# Patient Record
Sex: FEMALE | Race: OTHER | HISPANIC OR LATINO | URBAN - METROPOLITAN AREA
[De-identification: names, ages, dates, MRNs, and addresses within clinical notes are randomized per-mention and may not be internally consistent; named-entity substitution may affect disease eponyms.]

---

## 2021-01-20 ENCOUNTER — EMERGENCY (EMERGENCY)
Facility: HOSPITAL | Age: 51
LOS: 1 days | Discharge: HOME | End: 2021-01-20
Admitting: EMERGENCY MEDICINE
Payer: MEDICAID

## 2021-01-20 VITALS
HEART RATE: 84 BPM | TEMPERATURE: 99 F | WEIGHT: 169.98 LBS | SYSTOLIC BLOOD PRESSURE: 157 MMHG | OXYGEN SATURATION: 99 % | DIASTOLIC BLOOD PRESSURE: 90 MMHG | RESPIRATION RATE: 16 BRPM

## 2021-01-20 DIAGNOSIS — K05.10 CHRONIC GINGIVITIS, PLAQUE INDUCED: ICD-10-CM

## 2021-01-20 DIAGNOSIS — R68.84 JAW PAIN: ICD-10-CM

## 2021-01-20 DIAGNOSIS — K08.89 OTHER SPECIFIED DISORDERS OF TEETH AND SUPPORTING STRUCTURES: ICD-10-CM

## 2021-01-20 PROCEDURE — 99283 EMERGENCY DEPT VISIT LOW MDM: CPT

## 2021-01-20 RX ORDER — LIDOCAINE 4 G/100G
10 CREAM TOPICAL ONCE
Refills: 0 | Status: COMPLETED | OUTPATIENT
Start: 2021-01-20 | End: 2021-01-20

## 2021-01-20 RX ORDER — IBUPROFEN 200 MG
1 TABLET ORAL
Qty: 20 | Refills: 0
Start: 2021-01-20 | End: 2021-02-18

## 2021-01-20 RX ORDER — DIPHENHYDRAMINE HCL 50 MG
50 CAPSULE ORAL ONCE
Refills: 0 | Status: COMPLETED | OUTPATIENT
Start: 2021-01-20 | End: 2021-01-20

## 2021-01-20 RX ORDER — DIPHENHYDRAMINE HCL 50 MG
50 CAPSULE ORAL ONCE
Refills: 0 | Status: DISCONTINUED | OUTPATIENT
Start: 2021-01-20 | End: 2021-01-20

## 2021-01-20 RX ORDER — DIPHENHYDRAMINE HCL 50 MG
50 CAPSULE ORAL EVERY 6 HOURS
Refills: 0 | Status: DISCONTINUED | OUTPATIENT
Start: 2021-01-20 | End: 2021-01-20

## 2021-01-20 RX ORDER — PENICILLIN V POTASSIUM 250 MG
1 TABLET ORAL
Qty: 21 | Refills: 0
Start: 2021-01-20 | End: 2021-01-26

## 2021-01-20 RX ORDER — IBUPROFEN 200 MG
800 TABLET ORAL ONCE
Refills: 0 | Status: COMPLETED | OUTPATIENT
Start: 2021-01-20 | End: 2021-01-20

## 2021-01-20 RX ORDER — PENICILLIN V POTASSIUM 250 MG
500 TABLET ORAL ONCE
Refills: 0 | Status: COMPLETED | OUTPATIENT
Start: 2021-01-20 | End: 2021-01-20

## 2021-01-20 RX ADMIN — LIDOCAINE 10 MILLILITER(S): 4 CREAM TOPICAL at 21:47

## 2021-01-20 RX ADMIN — Medication 500 MILLIGRAM(S): at 21:47

## 2021-01-20 RX ADMIN — Medication 800 MILLIGRAM(S): at 21:47

## 2021-01-20 RX ADMIN — Medication 30 MILLILITER(S): at 21:47

## 2021-01-20 RX ADMIN — Medication 50 MILLIGRAM(S): at 21:47

## 2021-01-20 NOTE — ED PROVIDER NOTE - PATIENT PORTAL LINK FT
You can access the FollowMyHealth Patient Portal offered by [facility] by registering at the following website: [email or web address]. By joining [employer]’s FollowMyHealth portal, you will also be able to view your health information using other applications (apps) compatible with our system.

## 2021-01-20 NOTE — ED ADULT NURSE NOTE - NSIMPLEMENTINTERV_GEN_ALL_ED
Implemented All Universal Safety Interventions:  [place] to call system. Call bell, personal items and telephone within reach. Instruct patient to call for assistance. Room bathroom lighting operational. Non-slip footwear when patient is off stretcher. Physically safe environment: no spills, clutter or unnecessary equipment. Stretcher in lowest position, wheels locked, appropriate side rails in place.

## 2021-01-20 NOTE — ED PROVIDER NOTE - OBJECTIVE STATEMENT
51 yo F with PMHx of HTN, DM, frequent 51 yo F with PMHx of HTN, DM, frequent dental/gum infection, has appt with her private dentist tomorrow, presenting c/o toothache x few days.  Pt reports brushing her teeth harshly few days ago and induced some mild bleeding at that time.  Since then has been mild gum swelling and pain with biting.  Noted worsening pain today with no improvement taking advil and tylenol.  last dose APAP this afternoon and attempted icing with minimal improvement.  Denies fever, chills, change in phonation, drooling, throat/facial pain, d/c, earache, tinnitus, HA, dizziness, N/V/D/C, cough, SOB, CP, palpitations, focal weakness, paresthesia, rash, trauma, and fall

## 2021-01-20 NOTE — ED ADULT TRIAGE NOTE - CHIEF COMPLAINT QUOTE
dental pain on left side has appt with dentist tomorrow, nil obvious swelling, took 600mg motrin last dose 1600 , last dose tylenol 10 am 500mg

## 2021-01-20 NOTE — ED PROVIDER NOTE - CARE PROVIDER_API CALL
please follow up with your dentist tomorrow,   Phone: (   )    -  Fax: (   )    -  Follow Up Time:

## 2021-01-20 NOTE — ED PROVIDER NOTE - PHYSICAL EXAMINATION
Gen - WDWN F, NAD, comfortable and non-toxic appearing  Skin - warm, dry, intact, no rash   HEENT - AT/NC, PERRL, EOMI, o/p clear, +poor dentition with multiple dental caries and mild edema with friable gum edges to the L upper jaw (around tooth 13-15) with plaque formation and TTP, no fluctuance, d/c, bleeding, ulceration or exudate, airway patent, neck supple, no palpable nodes or facial edema   CV - S1S2, R/R/R  Resp - CTAB, no r/r/w  GI - soft, ND, NT, no CVAT b/l   MS - w/w/p, no c/c/e  Neuro - AxOx3, no focal neuro deficits, CN II-XII grossly intact, ambulatory without gait disturbance

## 2021-01-20 NOTE — ED PROVIDER NOTE - NSFOLLOWUPINSTRUCTIONS_ED_ALL_ED_FT
Gingivitis    WHAT YOU NEED TO KNOW:    What is gingivitis? Gingivitis is mild gum disease. It is an infection caused by germs called bacteria. Gingivitis occurs when there is a buildup of plaque (sticky film) on your teeth and gums. Plaque contains bacteria that can irritate your gums, and cause an infection. Without treatment, gingivitis may lead to a more serious form of gum disease called periodontitis. Periodontitis can cause other dental problems, and you may even may even lose your teeth. Gingivitis can be treated with good dental care from your dentist and at home. Gingivitis can go away, but may come back if you do not keep cleaning your teeth properly at home.    What may increase my risk of gingivitis?   •Not brushing or flossing your teeth every day, or cleaning your teeth well enough to remove plaque.      •Not visiting your dentist regularly for exams and cleanings.      •Pregnancy, diabetes, HIV infection, and diseases that decrease your immune system. Your body's immune system fights off infection.       •Smoking or chewing tobacco, poor diet, and stress.       •Taking certain types of medicines such as steroids, drugs that treat depression, and birth control pills.      •Having dental problems that make it hard to remove plaque. This may include bridges or dentures that do not fit right, and crooked teeth.      •Getting older, and having a family history of gum disease.      What are signs and symptoms of gingivitis? You may have red, swollen gums. Your gums may or may not be painful. Your gums may bleed when you brush or floss your teeth. Halitosis (bad breath) is worse if you have gingivitis.    How is gingivitis diagnosed? Your dentist will check your gums for swelling and redness. Your dentist will also use a dental probe to check for bleeding. X-rays may be taken of your mouth and teeth.     How is gingivitis treated? If your dentist finds that your gingivitis is found early and is not too bad, you may be able to treat it with good dental care at home. In some cases, you may need to visit your dentist more often for special dental cleanings. During these visits, your dentist may need to remove hard plaque from your teeth with special tools. Your dentist may also need to treat any dental problems that make it hard for you to clean your teeth well. Some of these problems include crooked teeth, or bridges and dentures that do not fit right.     How do I care for my teeth at home? Clean your teeth very well every day to remove plaque. Brush your teeth for at least 2 minutes, twice a day. A battery-powered toothbrush may remove plaque better than a regular toothbrush. You will also need to floss your teeth every day. Your dentist may also ask you to use a special dental rinse. These special rinses may help to reduce plaque and decrease swelling of your gums. If you smoke, you should quit. Smoking increases your risk of getting periodontitis, which can occur if your gingivitis gets worse. Smoking also decreases how well treatments for gum disease work.     How can I help prevent gingivitis?   •Brush your teeth 2 times a day after meals with fluoride toothpaste.      •Use dental floss to clean between your teeth at least once a day.      •Ask your dentist if you should use a dental rinse, and what kind may work best for you.      •See your dentist regularly for dental cleanings and oral exams.       When should I contact my healthcare provider?   •The bleeding in your gums gets worse.      •You cannot use a toothbrush, or you cannot use dental floss.      •You have a sore or lump on your gums that stays for over 3 weeks.       •You have a fever.       •You have 1 or more teeth that are loose for over 3 weeks, and you do not know why.       •Your face or neck is swollen.       •Your gums are more painful, or become painful.      When should I seek immediate care or call 911?   •You have new trouble swallowing.      •You have trouble breathing. Gingivitis    WHAT YOU NEED TO KNOW:    What is gingivitis? Gingivitis is mild gum disease. It is an infection caused by germs called bacteria. Gingivitis occurs when there is a buildup of plaque (sticky film) on your teeth and gums. Plaque contains bacteria that can irritate your gums, and cause an infection. Without treatment, gingivitis may lead to a more serious form of gum disease called periodontitis. Periodontitis can cause other dental problems, and you may even may even lose your teeth. Gingivitis can be treated with good dental care from your dentist and at home. Gingivitis can go away, but may come back if you do not keep cleaning your teeth properly at home.    What may increase my risk of gingivitis?   •Not brushing or flossing your teeth every day, or cleaning your teeth well enough to remove plaque.      •Not visiting your dentist regularly for exams and cleanings.      •Pregnancy, diabetes, HIV infection, and diseases that decrease your immune system. Your body's immune system fights off infection.       •Smoking or chewing tobacco, poor diet, and stress.       •Taking certain types of medicines such as steroids, drugs that treat depression, and birth control pills.      •Having dental problems that make it hard to remove plaque. This may include bridges or dentures that do not fit right, and crooked teeth.      •Getting older, and having a family history of gum disease.      What are signs and symptoms of gingivitis? You may have red, swollen gums. Your gums may or may not be painful. Your gums may bleed when you brush or floss your teeth. Halitosis (bad breath) is worse if you have gingivitis.    How is gingivitis diagnosed? Your dentist will check your gums for swelling and redness. Your dentist will also use a dental probe to check for bleeding. X-rays may be taken of your mouth and teeth.     How is gingivitis treated? If your dentist finds that your gingivitis is found early and is not too bad, you may be able to treat it with good dental care at home. In some cases, you may need to visit your dentist more often for special dental cleanings. During these visits, your dentist may need to remove hard plaque from your teeth with special tools. Your dentist may also need to treat any dental problems that make it hard for you to clean your teeth well. Some of these problems include crooked teeth, or bridges and dentures that do not fit right.     How do I care for my teeth at home? Clean your teeth very well every day to remove plaque. Brush your teeth for at least 2 minutes, twice a day. A battery-powered toothbrush may remove plaque better than a regular toothbrush. You will also need to floss your teeth every day. Your dentist may also ask you to use a special dental rinse. These special rinses may help to reduce plaque and decrease swelling of your gums. If you smoke, you should quit. Smoking increases your risk of getting periodontitis, which can occur if your gingivitis gets worse. Smoking also decreases how well treatments for gum disease work.     How can I help prevent gingivitis?   •Brush your teeth 2 times a day after meals with fluoride toothpaste.      •Use dental floss to clean between your teeth at least once a day.      •Ask your dentist if you should use a dental rinse, and what kind may work best for you.      •See your dentist regularly for dental cleanings and oral exams.       When should I contact my healthcare provider?   •The bleeding in your gums gets worse.      •You cannot use a toothbrush, or you cannot use dental floss.      •You have a sore or lump on your gums that stays for over 3 weeks.       •You have a fever.       •You have 1 or more teeth that are loose for over 3 weeks, and you do not know why.       •Your face or neck is swollen.       •Your gums are more painful, or become painful.      When should I seek immediate care or call 911?   •You have new trouble swallowing.      •You have trouble breathing.    Dental Pain    Dental pain (toothache) may be caused by many things including tooth decay (cavities or caries), abscess or infection, or trauma. If you were prescribed an antibiotic medicine, finish all of it even if you start to feel better. Rinsing your mouth with salt water or applying ice to the painful area of your face may help with the pain. Follow up with a dentist is important in ensuring good oral health and preventing the worsening of dental disease.    SEEK IMMEDIATE MEDICAL CARE IF YOU HAVE ANY OF THE FOLLOWING SYMPTOMS: unable to open your mouth, trouble breathing or swallowing, fever, or swelling of the face, neck, or jaw.

## 2021-01-20 NOTE — ED PROVIDER NOTE - CLINICAL SUMMARY MEDICAL DECISION MAKING FREE TEXT BOX
AFVSS at time of d/c, pt non-toxic appearing, results, ddx, and f/u plans discussed with pt at bedside, d/c'd home to f/u with PMD, strict return precautions discussed, prompt return to ER for any worsening or new sx, pt verbalized understanding. pt p/w gum swelling and pain x few days after brushing her teeth, now with worsening pain today, afebrile and no focal collection noted on exam, no systemic sx, encouraged pain control with magic mouth wash, motrin/apap, and course of pcn vk, pt has appt with her dentist tomorrow, AFVSS at time of d/c, pt non-toxic appearing, results, ddx, and f/u plans discussed with pt at bedside, strict return precautions discussed, prompt return to ER for any worsening or new sx, pt verbalized understanding.

## 2021-02-16 ENCOUNTER — EMERGENCY (EMERGENCY)
Facility: HOSPITAL | Age: 51
LOS: 1 days | Discharge: HOME | End: 2021-02-16
Attending: EMERGENCY MEDICINE | Admitting: EMERGENCY MEDICINE
Payer: MEDICAID

## 2021-02-16 VITALS
DIASTOLIC BLOOD PRESSURE: 95 MMHG | TEMPERATURE: 99 F | RESPIRATION RATE: 17 BRPM | OXYGEN SATURATION: 96 % | HEART RATE: 86 BPM | SYSTOLIC BLOOD PRESSURE: 147 MMHG

## 2021-02-16 PROCEDURE — 99282 EMERGENCY DEPT VISIT SF MDM: CPT

## 2021-02-16 NOTE — ED PROVIDER NOTE - PATIENT PORTAL LINK FT
You can access the FollowMyHealth Patient Portal offered by [facility] by registering at the following website: [email or web address]. By joining [employer]’s FollowMyHealth portal, you will also be able to view your health information using other applications (apps) compatible with our system.

## 2021-02-16 NOTE — ED PROVIDER NOTE - OBJECTIVE STATEMENT
51 y/o F presents to the ED requesting to have testing done for COVID-19. Pt endorses she is asymptomatic at this time. Pt denies fevers, chills, coughs, CP, and SOB.

## 2021-02-17 PROBLEM — I10 ESSENTIAL (PRIMARY) HYPERTENSION: Chronic | Status: ACTIVE | Noted: 2021-01-20

## 2021-02-17 PROBLEM — E11.9 TYPE 2 DIABETES MELLITUS WITHOUT COMPLICATIONS: Chronic | Status: ACTIVE | Noted: 2021-01-20

## 2021-02-17 LAB — SARS-COV-2 RNA SPEC QL NAA+PROBE: SIGNIFICANT CHANGE UP

## 2021-02-20 DIAGNOSIS — Z20.822 CONTACT WITH AND (SUSPECTED) EXPOSURE TO COVID-19: ICD-10-CM

## 2021-04-15 ENCOUNTER — EMERGENCY (EMERGENCY)
Facility: HOSPITAL | Age: 51
LOS: 1 days | Discharge: HOME | End: 2021-04-15
Attending: EMERGENCY MEDICINE | Admitting: EMERGENCY MEDICINE
Payer: MEDICAID

## 2021-04-15 VITALS
TEMPERATURE: 98 F | HEART RATE: 82 BPM | SYSTOLIC BLOOD PRESSURE: 165 MMHG | DIASTOLIC BLOOD PRESSURE: 95 MMHG | OXYGEN SATURATION: 98 % | RESPIRATION RATE: 18 BRPM

## 2021-04-15 DIAGNOSIS — S01.511A LACERATION WITHOUT FOREIGN BODY OF LIP, INITIAL ENCOUNTER: ICD-10-CM

## 2021-04-15 DIAGNOSIS — I10 ESSENTIAL (PRIMARY) HYPERTENSION: ICD-10-CM
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
## 2021-04-15 DIAGNOSIS — E11.9 TYPE 2 DIABETES MELLITUS WITHOUT COMPLICATIONS: ICD-10-CM

## 2021-04-15 DIAGNOSIS — Z79.899 OTHER LONG TERM (CURRENT) DRUG THERAPY: ICD-10-CM

## 2021-04-15 PROCEDURE — 12013 RPR F/E/E/N/L/M 2.6-5.0 CM: CPT

## 2021-04-15 PROCEDURE — 99283 EMERGENCY DEPT VISIT LOW MDM: CPT | Mod: 25

## 2021-04-15 RX ORDER — IBUPROFEN 200 MG
1 TABLET ORAL
Qty: 28 | Refills: 0
Start: 2021-04-15 | End: 2021-04-21

## 2021-04-15 RX ORDER — IBUPROFEN 200 MG
600 TABLET ORAL ONCE
Refills: 0 | Status: COMPLETED | OUTPATIENT
Start: 2021-04-15 | End: 2021-04-15

## 2021-04-15 RX ADMIN — Medication 600 MILLIGRAM(S): at 02:58

## 2021-04-15 RX ADMIN — Medication 1 TABLET(S): at 02:58

## 2021-04-15 NOTE — ED PROVIDER NOTE - PROGRESS NOTE DETAILS
patient declined plastics. had discussion with dr brenner on call plastics, advised internal and external suturing, patient given strict return precautions for signs of infection, called 24 hours post discharge for follow up. agrees with plan, advised to finish abx, and return for wound suture removal, return prior to that if there are signs of infection

## 2021-04-15 NOTE — ED PROVIDER NOTE - NSFOLLOWUPINSTRUCTIONS_ED_ALL_ED_FT
Please return in 7 days for suture removal.     Return prior to that if you have redness, swelling, or leakage of pus from the site.     Please finish the antibiotic     Laceration    A laceration is a cut that goes through all of the layers of the skin and into the tissue that is right under the skin. Some lacerations heal on their own. Others need to be closed with skin adhesive strips, skin glue, stitches (sutures), or staples. Proper laceration care minimizes the risk of infection and helps the laceration to heal better.  If non-absorbable stitches or staples have been placed, they must be taken out within the time frame instructed by your healthcare provider.    SEEK IMMEDIATE MEDICAL CARE IF YOU HAVE ANY OF THE FOLLOWING SYMPTOMS: swelling around the wound, worsening pain, drainage from the wound, red streaking going away from your wound, inability to move finger or toe near the laceration, or discoloration of skin near the laceration.

## 2021-04-15 NOTE — ED PROVIDER NOTE - OBJECTIVE STATEMENT
patient with no pmhx, presents with dog bite to R upper lip. notes her own dog bit her this evening, unprovoked. notes tetanus utd. notes dog has not left the home, and is not up to date with rabies vaccine, but has no signs of rabies. denies other injury.

## 2021-04-15 NOTE — ED ADULT TRIAGE NOTE - CHIEF COMPLAINT QUOTE
pt presents with laceration to upper lip from dog bite. dog not up to date with vaccines. pt unsure of last tetanus.

## 2021-04-15 NOTE — ED PROVIDER NOTE - CLINICAL SUMMARY MEDICAL DECISION MAKING FREE TEXT BOX
will repair lac at bedside, start augmentin, per patient, dog has no signs of rabies, and is her domestic dog at home.

## 2021-04-15 NOTE — ED PROCEDURE NOTE - CPROC ED LACER REPAIR DETAIL1
The wound was explored to base in bloodless field./All foreign material was removed./No foreign body/Multiple flaps were aligned.

## 2021-04-15 NOTE — ED PROVIDER NOTE - PATIENT PORTAL LINK FT
You can access the FollowMyHealth Patient Portal offered by [facility] by registering at the following website: [email or web address]. By joining [employer]’s FollowMyHealth portal, you will also be able to view your health information using other applications (apps) compatible with our system.

## 2021-04-21 ENCOUNTER — EMERGENCY (EMERGENCY)
Facility: HOSPITAL | Age: 51
LOS: 1 days | Discharge: HOME | End: 2021-04-21
Admitting: EMERGENCY MEDICINE
Payer: MEDICAID

## 2021-04-21 VITALS
SYSTOLIC BLOOD PRESSURE: 190 MMHG | RESPIRATION RATE: 18 BRPM | DIASTOLIC BLOOD PRESSURE: 107 MMHG | HEART RATE: 82 BPM | WEIGHT: 169.98 LBS | HEIGHT: 61 IN | OXYGEN SATURATION: 98 % | TEMPERATURE: 99 F

## 2021-04-21 DIAGNOSIS — I10 ESSENTIAL (PRIMARY) HYPERTENSION: ICD-10-CM

## 2021-04-21 DIAGNOSIS — E11.9 TYPE 2 DIABETES MELLITUS WITHOUT COMPLICATIONS: ICD-10-CM

## 2021-04-21 DIAGNOSIS — U07.1 COVID-19: ICD-10-CM

## 2021-04-21 LAB
ALBUMIN SERPL ELPH-MCNC: 4 G/DL — SIGNIFICANT CHANGE UP (ref 3.4–5)
ALP SERPL-CCNC: 52 U/L — SIGNIFICANT CHANGE UP (ref 40–120)
ALT FLD-CCNC: 23 U/L — SIGNIFICANT CHANGE UP (ref 12–42)
ANION GAP SERPL CALC-SCNC: 9 MMOL/L — SIGNIFICANT CHANGE UP (ref 9–16)
AST SERPL-CCNC: 22 U/L — SIGNIFICANT CHANGE UP (ref 15–37)
BASOPHILS # BLD AUTO: 0.05 K/UL — SIGNIFICANT CHANGE UP (ref 0–0.2)
BASOPHILS NFR BLD AUTO: 0.6 % — SIGNIFICANT CHANGE UP (ref 0–2)
BILIRUB SERPL-MCNC: 0.3 MG/DL — SIGNIFICANT CHANGE UP (ref 0.2–1.2)
BUN SERPL-MCNC: 16 MG/DL — SIGNIFICANT CHANGE UP (ref 7–23)
CALCIUM SERPL-MCNC: 9.3 MG/DL — SIGNIFICANT CHANGE UP (ref 8.5–10.5)
CHLORIDE SERPL-SCNC: 94 MMOL/L — LOW (ref 96–108)
CO2 SERPL-SCNC: 28 MMOL/L — SIGNIFICANT CHANGE UP (ref 22–31)
CREAT SERPL-MCNC: 0.75 MG/DL — SIGNIFICANT CHANGE UP (ref 0.5–1.3)
EOSINOPHIL # BLD AUTO: 0.19 K/UL — SIGNIFICANT CHANGE UP (ref 0–0.5)
EOSINOPHIL NFR BLD AUTO: 2.1 % — SIGNIFICANT CHANGE UP (ref 0–6)
GLUCOSE SERPL-MCNC: 158 MG/DL — HIGH (ref 70–99)
HCT VFR BLD CALC: 37.2 % — SIGNIFICANT CHANGE UP (ref 34.5–45)
HGB BLD-MCNC: 11.7 G/DL — SIGNIFICANT CHANGE UP (ref 11.5–15.5)
HYPOCHROMIA BLD QL: SLIGHT — SIGNIFICANT CHANGE UP
IMM GRANULOCYTES NFR BLD AUTO: 0.4 % — SIGNIFICANT CHANGE UP (ref 0–1.5)
LYMPHOCYTES # BLD AUTO: 2.52 K/UL — SIGNIFICANT CHANGE UP (ref 1–3.3)
LYMPHOCYTES # BLD AUTO: 28.3 % — SIGNIFICANT CHANGE UP (ref 13–44)
MANUAL SMEAR VERIFICATION: SIGNIFICANT CHANGE UP
MCHC RBC-ENTMCNC: 22.2 PG — LOW (ref 27–34)
MCHC RBC-ENTMCNC: 31.5 GM/DL — LOW (ref 32–36)
MCV RBC AUTO: 70.7 FL — LOW (ref 80–100)
MICROCYTES BLD QL: SLIGHT — SIGNIFICANT CHANGE UP
MONOCYTES # BLD AUTO: 1.21 K/UL — HIGH (ref 0–0.9)
MONOCYTES NFR BLD AUTO: 13.6 % — SIGNIFICANT CHANGE UP (ref 2–14)
NEUTROPHILS # BLD AUTO: 4.89 K/UL — SIGNIFICANT CHANGE UP (ref 1.8–7.4)
NEUTROPHILS NFR BLD AUTO: 55 % — SIGNIFICANT CHANGE UP (ref 43–77)
NRBC # BLD: 0 /100 WBCS — SIGNIFICANT CHANGE UP (ref 0–0)
NT-PROBNP SERPL-SCNC: 123 PG/ML — SIGNIFICANT CHANGE UP
PLAT MORPH BLD: NORMAL — SIGNIFICANT CHANGE UP
PLATELET # BLD AUTO: 349 K/UL — SIGNIFICANT CHANGE UP (ref 150–400)
POTASSIUM SERPL-MCNC: 3.9 MMOL/L — SIGNIFICANT CHANGE UP (ref 3.5–5.3)
POTASSIUM SERPL-SCNC: 3.9 MMOL/L — SIGNIFICANT CHANGE UP (ref 3.5–5.3)
PROT SERPL-MCNC: 8 G/DL — SIGNIFICANT CHANGE UP (ref 6.4–8.2)
RBC # BLD: 5.26 M/UL — HIGH (ref 3.8–5.2)
RBC # FLD: 15 % — HIGH (ref 10.3–14.5)
RBC BLD AUTO: ABNORMAL
SARS-COV-2 RNA SPEC QL NAA+PROBE: DETECTED
SODIUM SERPL-SCNC: 131 MMOL/L — LOW (ref 132–145)
TROPONIN I SERPL-MCNC: <0.017 NG/ML — LOW (ref 0.02–0.06)
WBC # BLD: 8.9 K/UL — SIGNIFICANT CHANGE UP (ref 3.8–10.5)
WBC # FLD AUTO: 8.9 K/UL — SIGNIFICANT CHANGE UP (ref 3.8–10.5)

## 2021-04-21 PROCEDURE — 71046 X-RAY EXAM CHEST 2 VIEWS: CPT | Mod: 26

## 2021-04-21 PROCEDURE — 99285 EMERGENCY DEPT VISIT HI MDM: CPT | Mod: 25

## 2021-04-21 PROCEDURE — 93010 ELECTROCARDIOGRAM REPORT: CPT

## 2021-04-21 PROCEDURE — 70450 CT HEAD/BRAIN W/O DYE: CPT | Mod: 26

## 2021-04-21 NOTE — ED ADULT NURSE NOTE - NSIMPLEMENTINTERV_GEN_ALL_ED
Implemented All Universal Safety Interventions:  [place] to call system. Call bell, personal items and telephone within reach. Instruct patient to call for assistance. Room bathroom lighting operational. Non-slip footwear when patient is off stretcher. Physically safe environment: no spills, clutter or unnecessary equipment. Stretcher in lowest position, wheels locked, appropriate side rails in place.

## 2021-04-21 NOTE — ED ADULT NURSE NOTE - CHIEF COMPLAINT QUOTE
walk in c/o hypertension x 1day with headaches. BP at home 167/128. in triage lt arm 192/111,  rt arm 190/107. Takes losartan 100mg daily. Started abx Augmentin last thurday for dog bite to face that needed stitches. PMX type 2 DM, HTN. self check  @5pm

## 2021-04-21 NOTE — ED ADULT TRIAGE NOTE - CHIEF COMPLAINT QUOTE
walk in c/o hypertension x 1day with headaches. BP at home 167/128. in triage lt arm 192/111,  rt arm 190/107. Takes losartan 100mg daily. Started abx last thurday for dog bite to face that needed stitches. PMX type 2 DM, HTN. self check  @5pm walk in c/o hypertension x 1day with headaches. BP at home 167/128. in triage lt arm 192/111,  rt arm 190/107. Takes losartan 100mg daily. Started abx Augmentin last thurday for dog bite to face that needed stitches. PMX type 2 DM, HTN. self check  @5pm

## 2021-04-22 VITALS
DIASTOLIC BLOOD PRESSURE: 90 MMHG | HEART RATE: 81 BPM | TEMPERATURE: 99 F | SYSTOLIC BLOOD PRESSURE: 160 MMHG | OXYGEN SATURATION: 96 % | RESPIRATION RATE: 16 BRPM

## 2021-04-22 NOTE — ED PROVIDER NOTE - PATIENT PORTAL LINK FT
You can access the FollowMyHealth Patient Portal offered by [facility] by registering at the following website: [email or web address]. By joining [employer]’s FollowMyHealth portal, you will also be able to view your health information using other applications (apps) compatible with our system.

## 2021-04-22 NOTE — ED PROVIDER NOTE - NSFOLLOWUPINSTRUCTIONS_ED_ALL_ED_FT
Hypertension    WHAT YOU NEED TO KNOW:    Hypertension is high blood pressure. Your blood pressure is the force of your blood moving against the walls of your arteries. Hypertension causes your blood pressure to get so high that your heart has to work much harder than normal. This can damage your heart. The cause of hypertension may not be known. This is called essential or primary hypertension. Hypertension caused by another medical condition, such as kidney disease, is called secondary hypertension.    DISCHARGE INSTRUCTIONS:    Call 911 for any of the following:     You have chest pain.      You have any of the following signs of a heart attack:   Squeezing, pressure, or pain in your chest       and any of the following:   Discomfort or pain in your back, neck, jaw, stomach, or arm       Shortness of breath      Nausea or vomiting      Lightheadedness or a sudden cold sweat      You become confused or have difficulty speaking.      You suddenly feel lightheaded or have trouble breathing.    Return to the emergency department if:     You have a severe headache or vision loss.      You have weakness in an arm or leg.     Contact your healthcare provider if:     You feel faint, dizzy, confused, or drowsy.       You have been taking your blood pressure medicine but your pressure is higher than your provider says it should be.      You have questions or concerns about your condition or care.    Medicines: You may need any of the following:     Antihypertensives may be used to help lower your blood pressure. Several kinds of medicines are available. Your healthcare provider will choose medicines based on the kind of hypertension you have. You may need more than one type of medicine. Take the medicine exactly as directed.       Diuretics help decrease extra fluid that collects in your body. This will help lower your blood pressure. You may urinate more often while you take this medicine.      Cholesterol medicine helps lower your cholesterol level. A low cholesterol level helps prevent heart disease and makes it easier to control your blood pressure.       Take your medicine as directed. Contact your healthcare provider if you think your medicine is not helping or if you have side effects. Tell him or her if you are allergic to any medicine. Keep a list of the medicines, vitamins, and herbs you take. Include the amounts, and when and why you take them. Bring the list or the pill bottles to follow-up visits. Carry your medicine list with you in case of an emergency.    Follow up with your healthcare provider as directed: You will need to return to have your blood pressure checked and to have other lab tests done. Write down your questions so you remember to ask them during your visits.     Stages of hypertension: Blood Pressure Readings         Normal blood pressure is 119/79 or lower. Your healthcare provider may only check your blood pressure each year if it stays at a normal level.      Elevated blood pressure is 120/79 to 129/79. This is sometimes called prehypertension. Your healthcare provider may suggest lifestyle changes to help lower your blood pressure to a normal level. He or she may then check it again in 3 to 6 months.      Stage 1 hypertension is 130/80 to 139/89. Your provider may recommend lifestyle changes, medication, and checks every 3 to 6 months until your blood pressure is controlled.      Stage 2 hypertension is 140/90 or higher. Your provider will recommend lifestyle changes and have you take 2 kinds of hypertension medicines. You will also need to have your blood pressure checked monthly until it is controlled.    Manage hypertension:     Check your blood pressure at home. Avoid smoking, caffeine, and exercise at least 30 minutes before checking your blood pressure. Sit and rest for 5 minutes before you take your blood pressure. Extend your arm and support it on a flat surface. Your arm should be at the same level as your heart. Follow the directions that came with your blood pressure monitor. Check your blood pressure 2 times, 1 minute apart, before you take your medicine in the morning. Also check your blood pressure before your evening meal. Keep a record of your readings and bring it to your follow-up visits. Ask your healthcare provider what your blood pressure should be. How to take a Blood Pressure           Manage any other health conditions you have. Health conditions such as diabetes can increase your risk for hypertension. Follow your healthcare provider's instructions and take all your medicines as directed.       Ask about all medicines. Certain medicines can increase your blood pressure. Examples include oral birth control pills, decongestants, herbal supplements, and NSAIDs, such as ibuprofen. Your healthcare provider can tell you which medicines are safe for you to take. This includes prescription and over-the-counter medicines.    Lifestyle changes you can make to manage hypertension:     Limit sodium (salt) as directed. Too much sodium can affect your fluid balance. Check labels to find low-sodium or no-salt-added foods. Some low-sodium foods use potassium salts for flavor. Too much potassium can also cause health problems. Your healthcare provider will tell you how much sodium and potassium are safe for you to have in a day. He or she may recommend that you limit sodium to 2,300 mg a day.           Follow the meal plan recommended by your healthcare provider. A dietitian or your provider can give you more information on low-sodium plans or the DASH (Dietary Approaches to Stop Hypertension) eating plan. The DASH plan is low in sodium, unhealthy fats, and total fat. It is high in potassium, calcium, and fiber.            Exercise to maintain a healthy weight. Exercise at least 30 minutes per day, on most days of the week. This will help decrease your blood pressure. Ask your healthcare provider about the best exercise plan for you. Walking for Exercise           Decrease stress. This may help lower your blood pressure. Learn ways to relax, such as deep breathing or listening to music.      Limit alcohol as directed. Alcohol can increase your blood pressure. A drink of alcohol is 12 ounces of beer, 5 ounces of wine, or 1½ ounces of liquor.      Do not smoke. Nicotine and other chemicals in cigarettes and cigars can increase your blood pressure and also cause lung damage. Ask your healthcare provider for information if you currently smoke and need help to quit. E-cigarettes or smokeless tobacco still contain nicotine. Talk to your healthcare provider before you use these products.     THE COVID 19 TEST RESULTS  - results may take up to 2-3 days to become available   - if you have consented, you will receive your results electronically   -  you can check [facility] or call [phone] to discuss your results with our nursing staff    Please continue to self quarantine (home isolation) until your result is back and follow instructions accordingly  - positive: complete home isolation for a total of 14 days since day of testing and no more fever with feeling back to baseline   - negative: you will be able to stop home isolation but still practice standard precautions, similar to how you would manage a regular flu/cold.    Return to ER for any worsening symptoms, such as persistent fever >100.4F, shortness of breath, coughing up bloody sputum, chest pain, lethargy, and fainting    Please remember to wash your hands frequently (>20 seconds each time), avoid touching your face, and cover your cough/sneeze.  Always wear a mask when you are outside of your home and practice social distancing.    Only take Tylenol for fever/pain control and avoid NSAIDs (ibuprofen/Advil/Aleve/naproxen) due to potential increased risk of exacerbating COVID-19 infection

## 2021-04-22 NOTE — ED PROVIDER NOTE - OBJECTIVE STATEMENT
52yo F with h/o HTN and DM presents today c/o elevated BP. pt takes losartan hydrochlorothiazide 100/25mg daily and has not missed any doses. pt was seen 1 week ago for a dog bite to her lip. she has been taking augmentin once daily x 7 days (although prescribed twice daily). she notes her BP has been elevated since yesterday and she had a headache and some nausea yesterday. she also describes some diarrhea today, nonbloody, nonwatery. denies fever, chills, vomiting, abd pain, cough, cold, cp, sob, urinary symptoms, vision changes, dizziness, numbness, tingling, weakness, any other concerns. notes she had a possible covid exposure last week.

## 2021-04-22 NOTE — ED PROVIDER NOTE - CLINICAL SUMMARY MEDICAL DECISION MAKING FREE TEXT BOX
pt presents today with c/o htn with headache and nausea since yesterday. BP improved in ED without medications. exam nonfocal. labs WNL. ekg nonischemic. cxr wnl. covid +

## 2021-05-28 NOTE — ED ADULT NURSE NOTE - SUICIDE SCREENING QUESTION 1
Order for Durable Medical Equipment was processed and equipment ordered.     DME provider: [facility]    Date Faxed: 4/29/2019    Ordering Provider: Drew Herbert MD    PAP Order Type: New Device order    Fax Number: IN HOUSE DME: [facility]          
No

## 2021-12-15 NOTE — ED PROVIDER NOTE - DISPOSITION TYPE
Dx: R Hip OA         Insurance (Authorized # of Visits):  Medicare (10)           Authorizing Physician: Dr. Iwona Chaparro  Next MD visit: none scheduled  Fall Risk: standard         Precautions: n/a             Subjective: More sore today - was standing more.    Shaila Manual support 2x15 each cues for posture  Standing hip abduction 2x10 2# each  Bike L1 for hip mobility seat 9 x3 min, easy spin Therex:  Prone lying x3 min 2 pillows under hips (added grade III hip PA)  Prone glute sets 20x3 sec holds  Standing lumbar/hip ext DISCHARGE

## 2022-03-25 PROBLEM — Z00.00 ENCOUNTER FOR PREVENTIVE HEALTH EXAMINATION: Status: ACTIVE | Noted: 2022-03-25

## 2022-03-31 ENCOUNTER — EMERGENCY (EMERGENCY)
Facility: HOSPITAL | Age: 52
LOS: 1 days | Discharge: HOME | End: 2022-03-31
Admitting: EMERGENCY MEDICINE
Payer: MEDICAID

## 2022-03-31 VITALS
HEIGHT: 61 IN | TEMPERATURE: 99 F | RESPIRATION RATE: 15 BRPM | SYSTOLIC BLOOD PRESSURE: 139 MMHG | WEIGHT: 166.01 LBS | DIASTOLIC BLOOD PRESSURE: 84 MMHG | OXYGEN SATURATION: 98 % | HEART RATE: 84 BPM

## 2022-03-31 PROCEDURE — 72100 X-RAY EXAM L-S SPINE 2/3 VWS: CPT | Mod: 26

## 2022-03-31 PROCEDURE — 99284 EMERGENCY DEPT VISIT MOD MDM: CPT | Mod: 25

## 2022-03-31 RX ORDER — METHOCARBAMOL 500 MG/1
2 TABLET, FILM COATED ORAL
Qty: 30 | Refills: 0
Start: 2022-03-31 | End: 2022-04-04

## 2022-03-31 NOTE — ED PROVIDER NOTE - PHYSICAL EXAMINATION
CONSTITUTIONAL: Well-appearing; well-nourished; in no apparent distress.   	HEAD: Normocephalic; atraumatic.   	EYES:  conjunctiva and sclera clear  	ENT: normal nose; no rhinorrhea; normal pharynx with no erythema or lesions.   	NECK: Supple; non-tender;   	CARDIOVASCULAR: Normal S1, S2; no murmurs, rubs, or gallops. Regular rate and rhythm.   	RESPIRATORY: Breathing easily; breath sounds clear and equal bilaterally; no wheezes, rhonchi, or rales.  	GI: Soft; non-distended; non-tender  Back: normal appearance, no midline tenderness.    	EXT: SAAB x 4. ambulatory. equal strength throughout x 4.   	SKIN: Normal for age and race; warm; dry; good turgor; no apparent lesions or rash.   	NEURO: A & O x 3; face symmetric; grossly unremarkable.   PSYCHOLOGICAL: The patient’s mood and manner are appropriate.

## 2022-03-31 NOTE — ED PROVIDER NOTE - CARE PROVIDER_API CALL
Tyree Cherry)  [facility]  [address], [address] [place] [address]  Phone: [phone]  Fax: [phone]  Follow Up Time:

## 2022-03-31 NOTE — ED PROVIDER NOTE - PATIENT PORTAL LINK FT
You can access the FollowMyHealth Patient Portal offered by [facility] by registering at the following website: [email or web address]. By joining [employer]’s FollowMyHealth portal, you will also be able to view your health information using other applications (apps) compatible with our system.

## 2022-03-31 NOTE — ED PROVIDER NOTE - OBJECTIVE STATEMENT
51 yo female with hx DM, HTN presents c/o lower back pain x1 month mostly over right SI joint and some pain to left lower back as well, relieved with tylenol. no numbness or tingling or weakness. no urinary or bowel incontinence. no fever/chills. no vomiting. no weight loss. has pmd.

## 2022-03-31 NOTE — ED PROVIDER NOTE - CLINICAL SUMMARY MEDICAL DECISION MAKING FREE TEXT BOX
lower back pain, atraumatic, symptoms occurring for a month, no neuro/motor deficits, ambulatory. xrays LS spine prelim no fx. will rx naproxen, robaxin, advised supportive care. f/u ortho spine/ pmd.

## 2022-03-31 NOTE — ED PROVIDER NOTE - NSFOLLOWUPINSTRUCTIONS_ED_ALL_ED_FT
Take medications as prescribed as needed  Follow up with your doctor/ortho spine.    Back pain is very common in adults. The cause of back pain is rarely dangerous and the pain often gets better over time. The cause of your back pain may not be known and may include strain of muscles or ligaments, degeneration of the spinal disks, or arthritis. Occasionally the pain may radiate down your leg(s). Over-the-counter medicines to reduce pain and inflammation are often the most helpful. Stretching and remaining active frequently helps the healing process.     SEEK IMMEDIATE MEDICAL CARE IF YOU HAVE ANY OF THE FOLLOWING SYMPTOMS: bowel or bladder control problems, unusual weakness or numbness in your arms or legs, nausea or vomiting, abdominal pain, fever, dizziness/lightheadedness.

## 2022-04-02 ENCOUNTER — TRANSCRIPTION ENCOUNTER (OUTPATIENT)
Age: 52
End: 2022-04-02

## 2022-04-04 DIAGNOSIS — I10 ESSENTIAL (PRIMARY) HYPERTENSION: ICD-10-CM

## 2022-04-04 DIAGNOSIS — M54.50 LOW BACK PAIN, UNSPECIFIED: ICD-10-CM

## 2022-04-04 DIAGNOSIS — E11.9 TYPE 2 DIABETES MELLITUS WITHOUT COMPLICATIONS: ICD-10-CM

## 2022-04-11 ENCOUNTER — APPOINTMENT (OUTPATIENT)
Dept: HEART AND VASCULAR | Facility: CLINIC | Age: 52
End: 2022-04-11
Payer: MEDICAID

## 2022-04-11 ENCOUNTER — NON-APPOINTMENT (OUTPATIENT)
Age: 52
End: 2022-04-11

## 2022-04-11 VITALS
OXYGEN SATURATION: 98 % | SYSTOLIC BLOOD PRESSURE: 145 MMHG | HEART RATE: 107 BPM | WEIGHT: 163 LBS | DIASTOLIC BLOOD PRESSURE: 94 MMHG | BODY MASS INDEX: 30.78 KG/M2 | HEIGHT: 61 IN

## 2022-04-11 DIAGNOSIS — I10 ESSENTIAL (PRIMARY) HYPERTENSION: ICD-10-CM

## 2022-04-11 DIAGNOSIS — E11.9 TYPE 2 DIABETES MELLITUS W/OUT COMPLICATIONS: ICD-10-CM

## 2022-04-11 PROCEDURE — 99204 OFFICE O/P NEW MOD 45 MIN: CPT

## 2022-04-11 PROCEDURE — 93000 ELECTROCARDIOGRAM COMPLETE: CPT

## 2022-04-11 RX ORDER — ATORVASTATIN CALCIUM 20 MG/1
20 TABLET, FILM COATED ORAL
Qty: 90 | Refills: 3 | Status: ACTIVE | COMMUNITY
Start: 2022-04-11 | End: 1900-01-01

## 2022-04-12 LAB
ALBUMIN SERPL ELPH-MCNC: 5 G/DL
ALP BLD-CCNC: 55 U/L
ALT SERPL-CCNC: 17 U/L
ANION GAP SERPL CALC-SCNC: 15 MMOL/L
AST SERPL-CCNC: 16 U/L
BASOPHILS # BLD AUTO: 0.07 K/UL
BASOPHILS NFR BLD AUTO: 0.7 %
BILIRUB SERPL-MCNC: 0.2 MG/DL
BUN SERPL-MCNC: 18 MG/DL
CALCIUM SERPL-MCNC: 10.6 MG/DL
CHLORIDE SERPL-SCNC: 100 MMOL/L
CHOLEST SERPL-MCNC: 174 MG/DL
CO2 SERPL-SCNC: 25 MMOL/L
CREAT SERPL-MCNC: 0.56 MG/DL
EGFR: 111 ML/MIN/1.73M2
EOSINOPHIL # BLD AUTO: 0.34 K/UL
EOSINOPHIL NFR BLD AUTO: 3.2 %
ESTIMATED AVERAGE GLUCOSE: 163 MG/DL
GLUCOSE SERPL-MCNC: 148 MG/DL
HBA1C MFR BLD HPLC: 7.3 %
HCT VFR BLD CALC: 39.6 %
HDLC SERPL-MCNC: 44 MG/DL
HGB BLD-MCNC: 12.2 G/DL
IMM GRANULOCYTES NFR BLD AUTO: 0.5 %
LDLC SERPL CALC-MCNC: 89 MG/DL
LYMPHOCYTES # BLD AUTO: 3.23 K/UL
LYMPHOCYTES NFR BLD AUTO: 30.5 %
MAN DIFF?: NORMAL
MCHC RBC-ENTMCNC: 22 PG
MCHC RBC-ENTMCNC: 30.8 GM/DL
MCV RBC AUTO: 71.5 FL
MONOCYTES # BLD AUTO: 0.97 K/UL
MONOCYTES NFR BLD AUTO: 9.2 %
NEUTROPHILS # BLD AUTO: 5.93 K/UL
NEUTROPHILS NFR BLD AUTO: 55.9 %
NONHDLC SERPL-MCNC: 130 MG/DL
PLATELET # BLD AUTO: 461 K/UL
POTASSIUM SERPL-SCNC: 4.6 MMOL/L
PROT SERPL-MCNC: 8 G/DL
RBC # BLD: 5.54 M/UL
RBC # FLD: 17.1 %
SODIUM SERPL-SCNC: 140 MMOL/L
TRIGL SERPL-MCNC: 205 MG/DL
TSH SERPL-ACNC: 1.67 UIU/ML
WBC # FLD AUTO: 10.59 K/UL

## 2022-04-13 ENCOUNTER — TRANSCRIPTION ENCOUNTER (OUTPATIENT)
Age: 52
End: 2022-04-13

## 2022-04-14 ENCOUNTER — NON-APPOINTMENT (OUTPATIENT)
Age: 52
End: 2022-04-14

## 2022-04-14 RX ORDER — METFORMIN HYDROCHLORIDE 500 MG/1
500 TABLET, COATED ORAL DAILY
Qty: 90 | Refills: 3 | Status: ACTIVE | COMMUNITY
Start: 2022-04-14

## 2022-04-14 RX ORDER — GLIPIZIDE 10 MG/1
10 TABLET ORAL DAILY
Qty: 90 | Refills: 0 | Status: ACTIVE | COMMUNITY
Start: 2022-04-14

## 2022-04-14 NOTE — ASSESSMENT
[FreeTextEntry1] :  52 year old woman with history of anxiety, panic attacks and palpitations since 2018, gestational DM and borderline HTN during pregnancy without preeclampsia, who presents to establish care. \par \par #Palpitations, dyspnea on exertion: Palpitations typically associated with anxiety, however worse recently along with ELLIS since Covid. \par - 7 day event monitor\par - TTE and stress TTE\par - Labs today\par - Discussed mental health care, mindfulness exercises, limit caffeine \par \par #HTN: BP above goal\par - Stop propranolol\par - Start carvedilol 6.25 mg BID\par - Continue losartan-HCTZ 100-25\par \par #NIDDM: Start atorvastatin 20 mg daily, continue metformin and glipizide.\par

## 2022-04-14 NOTE — PHYSICAL EXAM
[No Acute Distress] : no acute distress [Normal Venous Pressure] : normal venous pressure [Normal S1, S2] : normal S1, S2 [No Murmur] : no murmur [No Rub] : no rub [No Gallop] : no gallop [Clear Lung Fields] : clear lung fields [Soft] : abdomen soft [Non Tender] : non-tender [No Edema] : no edema

## 2022-04-14 NOTE — REASON FOR VISIT
[FreeTextEntry1] : Ms. Gutierrez is a 52 year old woman with history of anxiety, panic attacks and palpitations since 2018, gestational DM and borderline HTN during pregnancy without preeclampsia, who presents to establish care. She has had palpitations associated usually with anxiety after she had a traumatic medical encounter related to dental work. She notes a recent worsening of her palpitations and also endorses dyspnea on exertion, worse since Covid infection last year. No resting or exertional chest pain, no syncope, lightheadedness, orthopnea, PND or YOJANA. \par \par SH:\par Never smoker, no EtOH, no stimulants\par \par Meds:\par propranolol 80 daily\par losartan-HCTZ

## 2022-05-09 ENCOUNTER — TRANSCRIPTION ENCOUNTER (OUTPATIENT)
Age: 52
End: 2022-05-09

## 2022-05-09 RX ORDER — PROPRANOLOL HYDROCHLORIDE 80 MG/1
80 TABLET ORAL
Qty: 120 | Refills: 0 | Status: ACTIVE | COMMUNITY
Start: 2022-05-09 | End: 1900-01-01

## 2022-05-16 ENCOUNTER — APPOINTMENT (OUTPATIENT)
Dept: HEART AND VASCULAR | Facility: CLINIC | Age: 52
End: 2022-05-16

## 2022-08-09 PROBLEM — R06.09 DYSPNEA ON EXERTION: Status: ACTIVE | Noted: 2022-08-09

## 2022-08-11 RX ORDER — PROPRANOLOL HYDROCHLORIDE 80 MG/1
80 CAPSULE, EXTENDED RELEASE ORAL DAILY
Qty: 90 | Refills: 3 | Status: ACTIVE | COMMUNITY
Start: 2022-08-11 | End: 1900-01-01

## 2022-08-15 ENCOUNTER — EMERGENCY (EMERGENCY)
Facility: HOSPITAL | Age: 52
LOS: 1 days | Discharge: HOME | End: 2022-08-15
Admitting: EMERGENCY MEDICINE

## 2022-08-15 VITALS
HEART RATE: 77 BPM | HEIGHT: 61 IN | RESPIRATION RATE: 18 BRPM | DIASTOLIC BLOOD PRESSURE: 93 MMHG | TEMPERATURE: 98 F | WEIGHT: 158.07 LBS | OXYGEN SATURATION: 100 % | SYSTOLIC BLOOD PRESSURE: 131 MMHG

## 2022-08-15 PROCEDURE — 73030 X-RAY EXAM OF SHOULDER: CPT | Mod: 26,LT

## 2022-08-15 PROCEDURE — 99284 EMERGENCY DEPT VISIT MOD MDM: CPT | Mod: 25

## 2022-08-15 NOTE — ED PROVIDER NOTE - NSFOLLOWUPINSTRUCTIONS_ED_ALL_ED_FT
FOLLOW UP WITH ORTHOPEDIST AS ARRANGED BY CARE COORDINATOR.     Shoulder Pain    WHAT YOU NEED TO KNOW:    What do I need to know about shoulder pain? Shoulder pain is a common problem that can affect your daily activities. Pain can be caused by a problem within your shoulder, such as soreness of a tendon or bursa. A tendon is a cord of tough tissue that connects your muscles to your bones. The bursa is a fluid-filled sac that acts as a cushion between a bone and a tendon. Shoulder pain may also be caused by pain that spreads to your shoulder from another part of your body.  Shoulder Anatomy         What increases my risk for shoulder pain?   •Repeated overhead activities, such as baseball, weight lifting, or swimming      •Medical conditions, such as rotator cuff disease, diabetes, or thyroid disorders      •A quick increase in the amount or intensity of exercises, or improper technique during exercise      •Muscle imbalance or weakness      •Trauma or a fall      •Age older than 60      How is shoulder pain diagnosed? Your healthcare provider will ask about your pain, including how and when it started. Tell him or her if you have any weakness or if there was an injury. He or she will examine your shoulder and do tests to see how well you can move your shoulder. You may also need any of the following tests:   •An x-ray, ultrasound, CT, or MRI may be needed to show the cause of your shoulder pain. You may be given contrast liquid to help the shoulder area show up better in the pictures. Tell the healthcare provider if you have ever had an allergic reaction to contrast liquid. Do not enter the MRI room with anything metal. Metal can cause serious injury. Tell the healthcare provider if you have any metal in or on your body.      •An electromyography test measures the electrical activity of your muscles at rest and with movement.      How is shoulder pain treated?   •Acetaminophen decreases pain and fever. It is available without a doctor's order. Ask how much to take and how often to take it. Follow directions. Read the labels of all other medicines you are using to see if they also contain acetaminophen, or ask your doctor or pharmacist. Acetaminophen can cause liver damage if not taken correctly.      •NSAIDs, such as ibuprofen, help decrease swelling, pain, and fever. This medicine is available with or without a doctor's order. NSAIDs can cause stomach bleeding or kidney problems in certain people. If you take blood thinner medicine, always ask your healthcare provider if NSAIDs are safe for you. Always read the medicine label and follow directions.      •A steroid injection may help decrease pain and swelling.      •Surgery may be needed for long-term pain and loss of function.      How can I manage my symptoms?   •Apply ice on your shoulder for 20 to 30 minutes every 2 hours or as directed. Use an ice pack, or put crushed ice in a plastic bag. Cover it with a towel before you apply it to your shoulder. Ice helps prevent tissue damage and decreases swelling and pain.      •Apply heat if ice does not help your symptoms. Apply heat on your shoulder for 20 to 30 minutes every 2 hours for as many days as directed. Heat helps decrease pain and muscle spasms.      •Limit activities as directed. Try to avoid repeated overhead movements.      •Go to physical or occupational therapy as directed. A physical therapist teaches you exercises to help improve movement and strength, and to decrease pain. An occupational therapist teaches you skills to help with your daily activities.       How can I help prevent shoulder pain?   •Maintain a good range of motion in your shoulder. Ask your healthcare provider which exercises you should do on a regular basis after you have healed.       •Stretch and strengthen your shoulder. Use proper technique during exercises and sports.      When should I seek immediate care?   •You have severe pain.      •You cannot move your arm or shoulder.      •You have numbness or tingling in your shoulder or arm.      When should I contact my healthcare provider?   •Your pain gets worse or does not go away with treatment.      •You have trouble moving your arm or shoulder.      •You have questions or concerns about your condition or care.      CARE AGREEMENT:    You have the right to help plan your care. Learn about your health condition and how it may be treated. Discuss treatment options with your healthcare providers to decide what care you want to receive. You always have the right to refuse treatment.

## 2022-08-15 NOTE — ED PROVIDER NOTE - OBJECTIVE STATEMENT
53 y/o Female with PMHx of HTN and DM type 2 presenting with shoulder/arm pain since April 2022. Patient states that shes experiencing a stabbing pain in her left arm. Patient states that she slammed her left arm into a wall prior to the pain but it had been several days before the pain begun. Patient states that she got out of bed and felt the pain in both of her arms. Patient states that she has limited extension in her arms. Patient has taken ibuprofen with not much improvement. Patients PCP is aware of the issue and she has an appointment scheduled for october but because of the sharp pain today she came to the ER. Patient denies feeling the sharp pain prior to this past week. Denies dizziness, lightheadedness, weakness, tingling, and numbness. 51 y/o Female with PMHx of HTN and DM type 2 presenting with shoulder/arm pain since April 2022 onset shortly after striking her left shoulder against a wall and also while getting herself up out of bed. pain was initially in both shoulders but right shoulder has improved and left has gotten worse. described as stabbing in nature. pt takes ibuprofen occasionally with only temporary relief. has also been using exercises on youtube such as arm circles and wall walks to improve mobility which improved right shoulder.  Patients PCP is aware of the issue and she has an MRI appointment scheduled for october. Denies dizziness, lightheadedness, weakness, tingling, numbness, swelling, redness. pt is right handed and reports no limitations to hand/wrist/ADLs.

## 2022-08-15 NOTE — ED PROVIDER NOTE - CLINICAL SUMMARY MEDICAL DECISION MAKING FREE TEXT BOX
53 y/o Female with PMHx of HTN and DM type 2 presenting with arm/shoulder pain for the last 4 months. On exam patient had limited extension and abduction beyond 90 degrees of the left shoulder. Will obtain shoulder x-ray and reassess.

## 2022-08-15 NOTE — ED PROVIDER NOTE - MUSCULOSKELETAL, MLM
No bony tenderness to the clavicle and scapula. Normal ROM of the digits, wrist, and elbow. Limited extension and abduction beyond 90 degrees of the left shoulder. 5/5  strength equal bilaterally. 2+ radial pulses equal bilaterally. Superficial non healing bruise to the left anterior forearm.

## 2022-08-15 NOTE — ED PROVIDER NOTE - PATIENT PORTAL LINK FT
You can access the FollowMyHealth Patient Portal offered by [facility] by registering at the following website: [email or web address]. By joining [employer]’s FollowMyHealth portal, you will also be able to view your health information using other applications (apps) compatible with our system.

## 2022-08-15 NOTE — ED ADULT TRIAGE NOTE - CHIEF COMPLAINT QUOTE
Pt came in c/o of atraumatic left shoulder and upper arm pain x 4 months. Pt reports limited rom and increasing pain.

## 2022-08-17 ENCOUNTER — APPOINTMENT (OUTPATIENT)
Dept: ORTHOPEDIC SURGERY | Facility: CLINIC | Age: 52
End: 2022-08-17
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
## 2022-08-17 DIAGNOSIS — E11.9 TYPE 2 DIABETES MELLITUS WITHOUT COMPLICATIONS: ICD-10-CM

## 2022-08-17 DIAGNOSIS — I10 ESSENTIAL (PRIMARY) HYPERTENSION: ICD-10-CM
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
## 2022-08-17 DIAGNOSIS — M25.512 PAIN IN LEFT SHOULDER: ICD-10-CM

## 2022-08-17 DIAGNOSIS — M79.602 PAIN IN LEFT ARM: ICD-10-CM

## 2022-08-19 ENCOUNTER — APPOINTMENT (OUTPATIENT)
Dept: HEART AND VASCULAR | Facility: CLINIC | Age: 52
End: 2022-08-19

## 2022-08-19 DIAGNOSIS — R06.09 OTHER FORMS OF DYSPNEA: ICD-10-CM

## 2022-08-31 ENCOUNTER — APPOINTMENT (OUTPATIENT)
Dept: HEART AND VASCULAR | Facility: CLINIC | Age: 52
End: 2022-08-31

## 2022-09-20 NOTE — ED ADULT NURSE NOTE - NSFALLRSKASSESASSIST_ED_ALL_ED
Discontinue olmesartan and start amlodipine. Blood pressure is currently not controlled.
INR is great at 3.0. Plan to continue the current dose of warfarin.
no

## 2022-09-27 NOTE — ED ADULT NURSE NOTE - BREATH SOUNDS, MLM
Clear Consent (Scalp)/Introductory Paragraph: The rationale for Mohs was explained to the patient and consent was obtained. The risks, benefits and alternatives to therapy were discussed in detail. Specifically, the risks of changes in hair growth pattern secondary to repair, infection, scarring, bleeding, prolonged wound healing, incomplete removal, allergy to anesthesia, nerve injury and recurrence were addressed. Prior to the procedure, the treatment site was clearly identified and confirmed by the patient. All components of Universal Protocol/PAUSE Rule completed.

## 2022-10-13 ENCOUNTER — APPOINTMENT (OUTPATIENT)
Dept: HEART AND VASCULAR | Facility: CLINIC | Age: 52
End: 2022-10-13

## 2022-10-13 PROCEDURE — 93306 TTE W/DOPPLER COMPLETE: CPT

## 2022-10-17 ENCOUNTER — APPOINTMENT (OUTPATIENT)
Dept: HEART AND VASCULAR | Facility: CLINIC | Age: 52
End: 2022-10-17

## 2022-10-17 VITALS
HEIGHT: 61 IN | WEIGHT: 163.25 LBS | SYSTOLIC BLOOD PRESSURE: 122 MMHG | OXYGEN SATURATION: 99 % | DIASTOLIC BLOOD PRESSURE: 82 MMHG | HEART RATE: 78 BPM | BODY MASS INDEX: 30.82 KG/M2

## 2022-10-17 PROCEDURE — 99214 OFFICE O/P EST MOD 30 MIN: CPT

## 2022-10-17 RX ORDER — CARVEDILOL 6.25 MG/1
6.25 TABLET, FILM COATED ORAL TWICE DAILY
Qty: 180 | Refills: 2 | Status: DISCONTINUED | COMMUNITY
Start: 2022-04-11 | End: 2022-10-17

## 2022-10-17 NOTE — REASON FOR VISIT
[FreeTextEntry1] : Ms. Gutierrez is a 52 year old woman with history of anxiety, panic attacks and palpitations since 2018, gestational DM and borderline HTN during pregnancy without preeclampsia, who presents to establish care. She has had palpitations associated usually with anxiety after she had a traumatic medical encounter related to dental work. She notes a recent worsening of her palpitations and also endorses dyspnea on exertion, worse since Covid infection last year. No resting or exertional chest pain, no syncope, lightheadedness, orthopnea, PND or YOJANA. \par \par Since her last visit, patient has been feeling better. Dyspnea has significantly improved and palpitations have resolved since stopping coffee. Patient is increasing her daily exercise by walking, denies any new symptoms. She does still have occasional chest tightness only associated with emotional distress and anxiety, lasting the duration of her emotional duress and resolves rapidly without any associated symptoms such as dyspnea, chest pain, lightheadedness. Was not taking atorvastatin. \par \par SH:\par Never smoker, no EtOH, no stimulants\par \par Meds:\par propranolol 80 daily\par losartan-HCTZ \par Metformin\par Glipizide

## 2022-10-17 NOTE — ASSESSMENT
[FreeTextEntry1] : 52 year old woman with history of anxiety, panic attacks and palpitations since 2018, gestational DM and borderline HTN during pregnancy without preeclampsia, who presents to establish care. \par \par #Palpitations, dyspnea on exertion: Palpitations typically associated with anxiety, however worse recently along with ELLIS since Covid. Palpitations resolved since stopping coffee. \par - 7 day event monitor normal without significant ectopy\par - TTE and stress TTE normal\par - Discussed mental health care, mindfulness exercises, limit caffeine \par \par #HTN: BP at goal today\par - Continue propranolol, patient felt symptoms were not well managed by carvedilol \par - Continue losartan-HCTZ 100-25\par \par #NIDDM A1c 7.3: Counseled at length regarding risk-benefits of statin given significant risk factors of DM2 and HTN, resume atorvastatin 20 mg daily, continue metformin and glipizide.\par - Recheck A1c at next visit, can consider GLP/SGLT if patient amenable

## 2023-03-07 ENCOUNTER — EMERGENCY (EMERGENCY)
Facility: HOSPITAL | Age: 53
LOS: 1 days | Discharge: HOME | End: 2023-03-07
Admitting: EMERGENCY MEDICINE
Payer: MEDICAID

## 2023-03-07 VITALS
HEART RATE: 87 BPM | DIASTOLIC BLOOD PRESSURE: 87 MMHG | HEIGHT: 61 IN | SYSTOLIC BLOOD PRESSURE: 152 MMHG | OXYGEN SATURATION: 100 % | TEMPERATURE: 98 F | WEIGHT: 160.06 LBS | RESPIRATION RATE: 18 BRPM

## 2023-03-07 DIAGNOSIS — E11.9 TYPE 2 DIABETES MELLITUS WITHOUT COMPLICATIONS: ICD-10-CM

## 2023-03-07 DIAGNOSIS — Z20.822 CONTACT WITH AND (SUSPECTED) EXPOSURE TO COVID-19: ICD-10-CM

## 2023-03-07 DIAGNOSIS — I10 ESSENTIAL (PRIMARY) HYPERTENSION: ICD-10-CM

## 2023-03-07 DIAGNOSIS — Z00.00 ENCOUNTER FOR GENERAL ADULT MEDICAL EXAMINATION WITHOUT ABNORMAL FINDINGS: ICD-10-CM

## 2023-03-07 LAB
ALBUMIN SERPL ELPH-MCNC: 3.9 G/DL — SIGNIFICANT CHANGE UP (ref 3.4–5)
ALP SERPL-CCNC: 56 U/L — SIGNIFICANT CHANGE UP (ref 40–120)
ALT FLD-CCNC: 25 U/L — SIGNIFICANT CHANGE UP (ref 12–42)
ANION GAP SERPL CALC-SCNC: 8 MMOL/L — LOW (ref 9–16)
APTT BLD: 33.4 SEC — SIGNIFICANT CHANGE UP (ref 27.5–35.5)
AST SERPL-CCNC: 18 U/L — SIGNIFICANT CHANGE UP (ref 15–37)
BASOPHILS # BLD AUTO: 0.07 K/UL — SIGNIFICANT CHANGE UP (ref 0–0.2)
BASOPHILS NFR BLD AUTO: 0.6 % — SIGNIFICANT CHANGE UP (ref 0–2)
BILIRUB SERPL-MCNC: 0.3 MG/DL — SIGNIFICANT CHANGE UP (ref 0.2–1.2)
BUN SERPL-MCNC: 18 MG/DL — SIGNIFICANT CHANGE UP (ref 7–23)
CALCIUM SERPL-MCNC: 9.6 MG/DL — SIGNIFICANT CHANGE UP (ref 8.5–10.5)
CHLORIDE SERPL-SCNC: 103 MMOL/L — SIGNIFICANT CHANGE UP (ref 96–108)
CO2 SERPL-SCNC: 29 MMOL/L — SIGNIFICANT CHANGE UP (ref 22–31)
CREAT SERPL-MCNC: 0.77 MG/DL — SIGNIFICANT CHANGE UP (ref 0.5–1.3)
EGFR: 94 ML/MIN/1.73M2 — SIGNIFICANT CHANGE UP
EOSINOPHIL # BLD AUTO: 0.43 K/UL — SIGNIFICANT CHANGE UP (ref 0–0.5)
EOSINOPHIL NFR BLD AUTO: 3.8 % — SIGNIFICANT CHANGE UP (ref 0–6)
GLUCOSE SERPL-MCNC: 191 MG/DL — HIGH (ref 70–99)
HCT VFR BLD CALC: 37.6 % — SIGNIFICANT CHANGE UP (ref 34.5–45)
HGB BLD-MCNC: 11.6 G/DL — SIGNIFICANT CHANGE UP (ref 11.5–15.5)
IMM GRANULOCYTES NFR BLD AUTO: 0.4 % — SIGNIFICANT CHANGE UP (ref 0–0.9)
INR BLD: 1.13 — SIGNIFICANT CHANGE UP (ref 0.88–1.16)
LIDOCAIN IGE QN: 314 U/L — SIGNIFICANT CHANGE UP (ref 73–393)
LYMPHOCYTES # BLD AUTO: 3.79 K/UL — HIGH (ref 1–3.3)
LYMPHOCYTES # BLD AUTO: 33.8 % — SIGNIFICANT CHANGE UP (ref 13–44)
MACROCYTES BLD QL: SLIGHT — SIGNIFICANT CHANGE UP
MAGNESIUM SERPL-MCNC: 1.3 MG/DL — LOW (ref 1.6–2.6)
MANUAL SMEAR VERIFICATION: SIGNIFICANT CHANGE UP
MCHC RBC-ENTMCNC: 22 PG — LOW (ref 27–34)
MCHC RBC-ENTMCNC: 30.9 GM/DL — LOW (ref 32–36)
MCV RBC AUTO: 71.2 FL — LOW (ref 80–100)
MICROCYTES BLD QL: SLIGHT — SIGNIFICANT CHANGE UP
MONOCYTES # BLD AUTO: 0.98 K/UL — HIGH (ref 0–0.9)
MONOCYTES NFR BLD AUTO: 8.8 % — SIGNIFICANT CHANGE UP (ref 2–14)
NEUTROPHILS # BLD AUTO: 5.88 K/UL — SIGNIFICANT CHANGE UP (ref 1.8–7.4)
NEUTROPHILS NFR BLD AUTO: 52.6 % — SIGNIFICANT CHANGE UP (ref 43–77)
NRBC # BLD: 0 /100 WBCS — SIGNIFICANT CHANGE UP (ref 0–0)
NT-PROBNP SERPL-SCNC: 71 PG/ML — SIGNIFICANT CHANGE UP
OVALOCYTES BLD QL SMEAR: SLIGHT — SIGNIFICANT CHANGE UP
PLAT MORPH BLD: NORMAL — SIGNIFICANT CHANGE UP
PLATELET # BLD AUTO: 467 K/UL — HIGH (ref 150–400)
PLATELET COUNT - ESTIMATE: NORMAL — SIGNIFICANT CHANGE UP
POTASSIUM SERPL-MCNC: 4 MMOL/L — SIGNIFICANT CHANGE UP (ref 3.5–5.3)
POTASSIUM SERPL-SCNC: 4 MMOL/L — SIGNIFICANT CHANGE UP (ref 3.5–5.3)
PROT SERPL-MCNC: 8.4 G/DL — HIGH (ref 6.4–8.2)
PROTHROM AB SERPL-ACNC: 13.1 SEC — SIGNIFICANT CHANGE UP (ref 10.5–13.4)
RBC # BLD: 5.28 M/UL — HIGH (ref 3.8–5.2)
RBC # FLD: 15.7 % — HIGH (ref 10.3–14.5)
RBC BLD AUTO: ABNORMAL
SARS-COV-2 RNA SPEC QL NAA+PROBE: SIGNIFICANT CHANGE UP
SODIUM SERPL-SCNC: 140 MMOL/L — SIGNIFICANT CHANGE UP (ref 132–145)
TROPONIN I, HIGH SENSITIVITY RESULT: <4 NG/L — SIGNIFICANT CHANGE UP
WBC # BLD: 11.2 K/UL — HIGH (ref 3.8–10.5)
WBC # FLD AUTO: 11.2 K/UL — HIGH (ref 3.8–10.5)

## 2023-03-07 PROCEDURE — 99285 EMERGENCY DEPT VISIT HI MDM: CPT

## 2023-03-07 NOTE — ED PROVIDER NOTE - CLINICAL SUMMARY MEDICAL DECISION MAKING FREE TEXT BOX
Patient here for lab draw after abnormal results at PCP office  asymptomatic in ED   Labs unremarkable

## 2023-03-07 NOTE — ED PROVIDER NOTE - PATIENT PORTAL LINK FT
You can access the FollowMyHealth Patient Portal offered by [facility] by registering at the following website: [email or web address]. By joining [employer]’s FollowMyHealth portal, you will also be able to view your health information using other applications (apps) compatible with our system.

## 2023-03-07 NOTE — ED ADULT TRIAGE NOTE - CHIEF COMPLAINT QUOTE
received call from PCP for abnormal labwork , +K 6.5. labwork taken last Tues. + intermittent nausea/chest and back discomfort . denies sx today

## 2023-03-07 NOTE — ED PROVIDER NOTE - OBJECTIVE STATEMENT
53 y/o female hx of htn now here for lab draw after abnormal lab results at PCP office. Was called for high potassium at her PCPs office and was told to come to the emergency room to make sure it is not high. Asymptomatic. Denies fever, chills, hematuria, vaginal bleeding, d/c, abdominal pain, change in bowel function, flank pain, rash, HA, dizziness, SOB, CP, palpitations, diaphoresis, cough, and malaise.  Patient appears well NAD stable.

## 2023-03-07 NOTE — ED ADULT NURSE NOTE - OBJECTIVE STATEMENT
Pt came to ER requesting lab work for potassium. Pt PCP called and told her result from last tuesday was 6.5, redraw done at office, but patient did not want to wait for result. Denies symptoms.

## 2023-04-17 ENCOUNTER — APPOINTMENT (OUTPATIENT)
Dept: HEART AND VASCULAR | Facility: CLINIC | Age: 53
End: 2023-04-17

## 2023-09-01 ENCOUNTER — EMERGENCY (EMERGENCY)
Facility: HOSPITAL | Age: 53
LOS: 1 days | Discharge: HOME | End: 2023-09-01
Admitting: EMERGENCY MEDICINE
Payer: MEDICAID

## 2023-09-01 VITALS
SYSTOLIC BLOOD PRESSURE: 178 MMHG | TEMPERATURE: 98 F | DIASTOLIC BLOOD PRESSURE: 99 MMHG | OXYGEN SATURATION: 98 % | HEART RATE: 79 BPM | HEIGHT: 61 IN | RESPIRATION RATE: 18 BRPM | WEIGHT: 164.91 LBS

## 2023-09-01 PROCEDURE — 99284 EMERGENCY DEPT VISIT MOD MDM: CPT

## 2023-09-01 RX ORDER — METHOCARBAMOL 500 MG/1
1500 TABLET, FILM COATED ORAL ONCE
Refills: 0 | Status: COMPLETED | OUTPATIENT
Start: 2023-09-01 | End: 2023-09-01

## 2023-09-01 RX ORDER — METHOCARBAMOL 500 MG/1
2 TABLET, FILM COATED ORAL
Qty: 30 | Refills: 0
Start: 2023-09-01 | End: 2023-09-05

## 2023-09-01 RX ADMIN — Medication 500 MILLIGRAM(S): at 18:41

## 2023-09-01 RX ADMIN — METHOCARBAMOL 1500 MILLIGRAM(S): 500 TABLET, FILM COATED ORAL at 18:41

## 2023-09-01 NOTE — ED PROVIDER NOTE - CLINICAL SUMMARY MEDICAL DECISION MAKING FREE TEXT BOX
paracervical muscle pain radiating to head x 3 days after carrying baby and turning head to right side, appears musculoskeletal. no indication for imaging, will rx naproxen, muscle relaxants, f/u pmd

## 2023-09-01 NOTE — ED PROVIDER NOTE - PHYSICAL EXAMINATION
CONSTITUTIONAL: Well-appearing; well-nourished; in no apparent distress.   	HEAD: Normocephalic; atraumatic.   	EYES:  conjunctiva and sclera clear  	ENT: normal nose; no rhinorrhea; normal pharynx with no erythema or lesions.   	NECK: Supple; no midline tenderness. +bilateral paracervical tenderness. limited ROM neck due to pain  	CARDIOVASCULAR: Normal S1, S2; no murmurs, rubs, or gallops. Regular rate and rhythm.   	RESPIRATORY: Breathing easily; breath sounds clear and equal bilaterally; no wheezes, rhonchi, or rales.  	GI: Soft; non-distended; non-tender  	EXT: SAAB x 4. normal gait.  	SKIN: Normal for age and race; warm; dry; good turgor; no apparent lesions or rash.   	NEURO: A & O x 3; face symmetric; grossly unremarkable.   PSYCHOLOGICAL: The patient’s mood and manner are appropriate.

## 2023-09-01 NOTE — ED ADULT NURSE NOTE - NSFALLUNIVINTERV_ED_ALL_ED
Bed/Stretcher in lowest position, wheels locked, appropriate side rails in place/Call bell, personal items and telephone in reach/Instruct patient to call for assistance before getting out of bed/chair/stretcher/Non-slip footwear applied when patient is off stretcher/[place] to call system/Physically safe environment - no spills, clutter or unnecessary equipment/Purposeful proactive rounding/Room/bathroom lighting operational, light cord in reach

## 2023-09-01 NOTE — ED ADULT TRIAGE NOTE - CHIEF COMPLAINT QUOTE
Pt walks in c/o sudden onset neck pain that began 3 days ago while she was holding daughter. Pt states she had similar pain many years ago. ROM intact but painful.

## 2023-09-01 NOTE — ED PROVIDER NOTE - DISCHARGE DATE

## 2023-09-01 NOTE — ED PROVIDER NOTE - OBJECTIVE STATEMENT
54 yo female with hx HTN presents c/o bilateral paracervical neck pain that started 3 days ago while carrying a baby and turning her head to the right side. pain radiates up the head. has been taking acetaminophen for pain. limited ROM neck due to pain. no fever/chills/rash/vomiting or photophobia.

## 2023-09-01 NOTE — ED PROVIDER NOTE - PATIENT PORTAL LINK FT
You can access the FollowMyHealth Patient Portal offered by [facility] by registering at the following website: [email or web address]. By joining [email or web address]’s FollowMyHealth portal, you will also be able to view your health information using other applications (apps) compatible with our system.

## 2023-09-01 NOTE — ED PROVIDER NOTE - NSFOLLOWUPINSTRUCTIONS_ED_ALL_ED_FT
Take medications as prescribed  Warm compresses    Follow up with your primary care doctor.    Return for any concerning or worsening symptoms such as confusion, vomiting, severe pain or any concerns

## 2023-09-05 DIAGNOSIS — I10 ESSENTIAL (PRIMARY) HYPERTENSION: ICD-10-CM

## 2023-09-05 DIAGNOSIS — M54.2 CERVICALGIA: ICD-10-CM

## 2023-11-21 ENCOUNTER — EMERGENCY (EMERGENCY)
Facility: HOSPITAL | Age: 53
LOS: 1 days | Discharge: HOME | End: 2023-11-21
Admitting: EMERGENCY MEDICINE
Payer: MEDICAID

## 2023-11-21 VITALS
TEMPERATURE: 99 F | HEART RATE: 92 BPM | DIASTOLIC BLOOD PRESSURE: 86 MMHG | OXYGEN SATURATION: 98 % | HEIGHT: 61 IN | SYSTOLIC BLOOD PRESSURE: 141 MMHG | RESPIRATION RATE: 17 BRPM | WEIGHT: 162.04 LBS

## 2023-11-21 PROCEDURE — 99284 EMERGENCY DEPT VISIT MOD MDM: CPT

## 2023-11-21 PROCEDURE — 70450 CT HEAD/BRAIN W/O DYE: CPT | Mod: 26

## 2023-11-21 NOTE — ED ADULT NURSE NOTE - OBJECTIVE STATEMENT
pt c/o headache x2 months, denies vision changes, denies N/V, denies dizziness, denies numbness/tingling. a+ox4, resp even and unlabored, steady gait. of note pt states she had a nose bleed today, since resolved with bleeding controlled.

## 2023-11-21 NOTE — ED PROVIDER NOTE - OBJECTIVE STATEMENT
54 y/o Female coming in for headache for the past 2 months. Patient states that she was previously diagnosed with TMJ. Patient is now coming in requesting CT head. Denies fever/chills, weakness, and numbness.

## 2023-11-21 NOTE — ED ADULT NURSE NOTE - SUICIDE SCREENING DEPRESSION
Your Child's Health  18-Month-Old Visit      Hayder Awad  August 20, 2018    Visit Vitals  Ht 33.25\" (84.5 cm)   Wt 11.3 kg   HC 48.9 cm (19.25\")   BMI 15.80 kg/m²     Weight: 24.84 lbs      YOUR CHILD’S 18 MONTH OLD VISIT       Key points at this age…  • Hayder should continue to ride in a properly-installed car seat in the back seat. Correct use of a car seat is always critically important for your child’s safety. For maximum safety, keep the seat rear facing until your child reaches the top height or weight limit allowed by the car seat .        • Help your child’s language develop by talking to them with clear, simple words and reading lots of books together. Electronic media (TV, pads, phones, computers) are not recommended at this age.     • Take care of those teeth! Help your child brush twice daily with a tiny amount of regular (not baby) toothpaste. Avoid sugary and sticky foods and sweetened drinks like juice and soda. If you give your child juice, limit it to no more than 4 ounces a day of 100% juice.      Nutrition & Healthy Weight:   Healthy eating is a challenge for everyone, especially for busy parents of toddlers who can often be picky and hard to please! But it is important-- nearly 1 in 3 children in our country is overweight or obese which poses serious health risks for them as they get older.      Here are some things to think about at this age:   Water and milk are the healthiest drink choices for your children.      Avoid junk food and sweet things-- fried fast food, bagged snacks, candy, fruit snacks, sugary cereals, juice, Philip-A[employer], etc. Once your child develops a “sweet tooth” for these things, they will always be asking for them. Too much sugar is bad for their teeth and their overall nutrition and weight. Children this age should have no more than 4 ounces of 100% fruit juice daily. Do not water it down in a bottle or sippy cup that they can carry around and drink  from over an extended period of time; this frequent exposure to the sugars in juice (even if diluted with water) just increases their risk of tooth decay.       Don’t push your children to eat when they do not want to. Offer them small portions at a time; they can have seconds when they eat everything they were offered. Serving large portions may result in eating more than they need (and waste!).      Toddlers often resist new foods, but keep trying! Most will eventually try something new after it is offered several times. (Don’t get into the habit of serving only what they like!)    Even when eating a very well balanced diet, children need some extra vitamin D. A liquid preparation of pure vitamin D (400 or 600 IU) or a multivitamin with iron drop is recommended.  (They are currently too young for a chewable vitamin because they could choke on those.)     DENTAL CARE:   By now you should be brushing your child’s teeth twice daily, especially at bedtime. (It’s okay if they think you are “just helping” them!) Brush with a tiny smear of regular (fluoridated) toothpaste (not baby toothpaste).  Using city water to drink and cook with provides important fluoride to strengthen and protect teeth against cavities. If you have well water instead of a city water supply, however, you should have it tested for fluoride content to determine whether your child might need fluoride supplements.     DEVELOPMENT & BEHAVIOR:  Toddlers will be learning lots of new skills at this age and over the next several months! They should be walking well and often running; most will try to climb stairs (if you let them!). They may help get themselves undressed, scribble, and use a cup and spoon fairly well. They should have at least 6 words, will gesture and point to show someone what they want. They will “pretend” their dolls or stuffed animals are real, and they will hopefully know the name of their favorite book. Over the next several months,  help them learn by pointing to and naming pictures in books as well as their own body parts. Talking, singing and reading together are great ways to advance your baby’s language skills!      The American Academy of Pediatrics (AAP) does not recommend any “screen time” (TV, videos, pads, phones) except for video chatting before age 18 months. If parents of children between 18 months and 2 years want to start some screen time, they should choose high-quality programming (the AAP suggests work created by "[employer] or [employer]).  Watching should be limited to very brief time periods (minutes, not hours), and parents should watch these programs with their child to help children understand what they are seeing--children should not be given devices to watch on their own.     Most children are not developmentally ready for potty-training until closer to 3 years old. If your child seems interested, it is fine to get them a potty chair to use (and provide lots of praise when they use it!). Just remember their young body is not likely to be developed enough at this age for them to be fully potty trained at this time.       TEMPER TANTRUMS:  These begin about the time a child starts walking and can continue until they are 3½ years old (or older). Toddlers tantrum because they are frustrated that they can’t say what they want or need or they are angry because they cannot get or have something they want.  The most effective method of dealing with tantrums is to ignore them and wait for it to pass. Once your child is calm, simply direct them to a new activity. Don’t try to “discuss” their tantrum (they don’t get that!) and certainly do not promise them a reward for stopping their crying. Having (and enforcing) consistent rules and giving  your child a lot of positive attention when they are behaving well (“time in”) will promote good behavior and reduce tantrums. When your child is misbehaving (hitting, biting), brief time-outs  (which essentially means ignoring your child for a brief period) work because your child wants your attention more than anything else.     CAR SAFETY: An approved car seat in the back seat is required by [place] law. Your child is safest in a rear-facing convertible car seat right now: keep the seat in a rear-facing position until your child reaches the top height or weight limit allowed by the car seat . (Really! Even if your child is tall and they have to keep their legs bent, they are still safer when rear facing. And kids generally do not mind having their legs bunched up-- that really seems to bother parents more than it bothers kids!)     SAFETY & ACCIDENT PREVENTION:  By this age, your home should be pretty well safety-proofed. Here are some reminders about possible safety risks for curious, active toddlers!    1. Burns:  Keep dangerous items out of reach, including hot liquids, hot foods, and electrical cords on appliances such as irons, toasters, and coffee pots--children can pull on cords and suffer serious burns if they pull something hot down onto themselves. Have a family fire safety plan, including regular smoke detector (and carbon monoxide detector) battery checks, working fire extinguishers, and escape routes.  2. Falls: Keep cotto on stairs and window latches on upper-story windows.  3. Water Safety:  Children can drown in just a couple inches of water, so never leave a toddler alone in a tub. Also, do not rely on older children to properly supervise the younger ones. Children this young are not developmentally ready for swimming lessons, and life jackets and “swimmies” will NOT protect your child from drowning! When near water, children need constant supervision by an adult who knows how to swim. Permanent pools (in ground and above ground) should be fenced off (and locked), and wading pools should be drained when not in use. Keep large buckets empty and keep toilet seat lids down  and secured with a safety lock if possible.   4.  Poisoning:  Continue to keep all cleaning and chemical products safely stored out of sight and out of reach. (Check for what is under your bathroom sink as well as your kitchen sink.) Keep purses (your own and ones belonging to visitors) out of reach since curious toddlers can quickly find medicines, cigarettes, and small objects to choke on! Keep the original bottles and safety caps for all medicines, including vitamins; do not transfer medicines to non-child-proofed or unlabeled containers. Be especially careful when around older people because they may keep their medicines in clear sight or in non-childproofed containers.   Is this number in your cell phone? Call the Poison Center at 1-319.285.9933 for any known or suspected poisoning.      SMOKING:  Continue to protect your child from cigarette smoke. Exposure to smoke will increase their risk of asthma, ear infections, and respiratory infections (pneumonia). If you smoke and are ready to consider quitting, talk to your doctor. Nicotine replacement products can be very helpful in breaking this tough addiction.     LEAD EXPOSURE:    If you live in [place], your child should be screened for lead now if they were not checked at age 12 months. The [facility] Department recommends screening children three times in the first 3 years of life. If you do not live in [place], talk to your doctor about lead screening if any of the following apply: (1) your child currently (or had previously) lives in or frequently visits a house or building built before 1950 (including , grandparent homes, etc); (2) your child currently (or had previously) lives in or frequently visits a house or building built before 1978 with recent or ongoing renovations; (3) your child has a brother, sister or playmate who has had lead poisoning; (4) your child is enrolled in Medicaid or WIC. Very rarely, other sources of lead  exposure can include herbal remedies or imported items (mini blinds, canned goods). Do an internet search for “[facility] Lead” for helpful information about lead risks in [place]. If you have questions about older neighborhoods in [place] that might have lead connecting (or service) pipes, do an internet search for \"[place] lead awareness and drinking water safety\". From this web page, you can search for your address to find out if your home was built with lead service lines. There are also helpful hints regarding water safety on this site.     SUN EXPOSURE:  Keep your child in shade and out of direct sun as much as you can. Protective clothing as well as hats and sunglasses help protect against sunburn and skin damage. When your child is going to be outside, always use a “broad spectrum” sunscreen (which means it protects against both UVA and UVB rays) with an SPF of 15 to 30; apply it to your child’s skin at least 20 to 30 minutes before they go out. Kids this age often like the zinc oxide (or titanium dioxide) products which are good for sensitive spots (nose, cheeks, ears, shoulders); these don’t “rub in” all the way, but kids often like the fun colors they come in!      MEDICATION FOR FEVER OR PAIN:   Acetaminophen liquid (e.g., Tylenol or Tempra) may be given every four hours as needed for pain or fever.  Acetaminophen liquid is less concentrated than the infant dropper bottle type.  Be sure to check which product CONCENTRATION you are using.    INFANT Tylenol/Acetaminophen  Drops (160 mg/5 mL)    Child’s Weight: Dose:  24 - 35 pounds:   160 mg (5.0 mL (1 Teaspoon))    CHILDREN’S Tylenol/Acetaminophen  (160 mg/5 mL)    Child’s Weight: Dose:  24 - 35 pounds:   160 mg (5.0 mL (1 Teaspoon))    CHILDREN'S Ibuprofen (100 mg/5 mL) liquid (for example, Advil or Motrin) may be given every 6 hours as needed for pain or fever.    Child’s Weight: Dose:  24 - 35 pounds:   100 mg (5.0  mL(1Teaspoon))    If Hayder is outside this weight range, call your pediatrician's office for advice      Most Recent Immunizations   Administered Date(s) Administered   • DTaP(INFANRIX) 05/19/2020   • DTaP/Hep B/IPV 2019   • Hep A, ped/adol, 2 dose 02/17/2020   • Hep B, adolescent or pediatric 2019   • Hib (PRP-OMP) 05/19/2020   • Hib (PRP-T) 2019   • Influenza, injectable, quadrivalent, preservative-free 2019   • MMR 02/17/2020   • Pneumococcal Conjugate 13 valent 05/19/2020   • Rotavirus - pentavalent 2019   • Varicella 02/17/2020       If Hayder develops any of the following reactions within 72 hours after an immunization, notify your pediatrician by calling the pediatric phone nurse:  1. A temperature of 105 degrees or above.  2. More than 3 hours of continuous crying.  3. A shrill, high-pitched cry.  4. A pale, limp spell.  5. A seizure or fainting spell. In this case, you should call 911 or go immediately to the emergency room.    NEXT VISIT:  2 YEARS OF AGE    Thank you for entrusting your care to [facility].      Also, check out “Children’s Health” on the [facility] Blog for updates on timely topics regarding children’s health!   Negative

## 2023-11-21 NOTE — ED PROVIDER NOTE - PATIENT PORTAL LINK FT
You can access the FollowMyHealth Patient Portal offered by [facility] by registering at the following website: [email or web address]. By joining [employer]’s FollowMyHealth portal, you will also be able to view your health information using other applications (apps) compatible with our system.

## 2023-11-21 NOTE — ED PROVIDER NOTE - CLINICAL SUMMARY MEDICAL DECISION MAKING FREE TEXT BOX
54 y/o Female with PMHx of TMJ presenting with headache for the past 2 months. Exam unremarkable. CT unremarkable. Patient is well appearing and vibrant at time of discharge.

## 2023-11-24 DIAGNOSIS — Z87.39 PERSONAL HISTORY OF OTHER DISEASES OF THE MUSCULOSKELETAL SYSTEM AND CONNECTIVE TISSUE: ICD-10-CM

## 2023-11-24 DIAGNOSIS — R51.9 HEADACHE, UNSPECIFIED: ICD-10-CM

## 2023-12-05 NOTE — ED ADULT NURSE NOTE - CAS EDP DISCH TYPE
Provider: EVIE    Caller: HILARIO LAWSON    Relationship to Patient: SELF    Reason for Call: PATIENT WOULD LIKE TO HAVE A REFERRAL FOR A GASTRO DOCTOR IN [place] IF POSSIBLE. PLEASE CALL PATIENT REGARDING THIS     Home

## 2023-12-08 NOTE — ED PROVIDER NOTE - NSICDXNOPASTSURGICALHX_GEN_ALL_ED
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 Scaring 50-70% but I think benefit would still outweigh risk. 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 He will see GI on 12/13/23. Will check more labs before. Will place order for rituximab now. Would avoid steroid at this time. Will give Rtixumab 375 mg/m2 x4 doses. Will use Rheum plan since Neph plan has no 4 doses regimen.       Sue Harrison MD on 12/8/2023 at 3:11 PM    
<-- Click to add NO significant Past Surgical History

## 2024-01-12 ENCOUNTER — EMERGENCY (EMERGENCY)
Facility: HOSPITAL | Age: 54
LOS: 1 days | Discharge: HOME | End: 2024-01-12
Admitting: EMERGENCY MEDICINE
Payer: MEDICAID

## 2024-01-12 VITALS
TEMPERATURE: 98 F | WEIGHT: 160.06 LBS | OXYGEN SATURATION: 99 % | RESPIRATION RATE: 18 BRPM | HEIGHT: 61 IN | SYSTOLIC BLOOD PRESSURE: 147 MMHG | DIASTOLIC BLOOD PRESSURE: 85 MMHG | HEART RATE: 84 BPM

## 2024-01-12 VITALS
RESPIRATION RATE: 17 BRPM | DIASTOLIC BLOOD PRESSURE: 79 MMHG | OXYGEN SATURATION: 99 % | TEMPERATURE: 98 F | SYSTOLIC BLOOD PRESSURE: 118 MMHG | HEART RATE: 82 BPM

## 2024-01-12 DIAGNOSIS — I10 ESSENTIAL (PRIMARY) HYPERTENSION: ICD-10-CM

## 2024-01-12 DIAGNOSIS — E11.9 TYPE 2 DIABETES MELLITUS WITHOUT COMPLICATIONS: ICD-10-CM

## 2024-01-12 DIAGNOSIS — R07.89 OTHER CHEST PAIN: ICD-10-CM

## 2024-01-12 DIAGNOSIS — Z20.822 CONTACT WITH AND (SUSPECTED) EXPOSURE TO COVID-19: ICD-10-CM

## 2024-01-12 DIAGNOSIS — R00.2 PALPITATIONS: ICD-10-CM

## 2024-01-12 DIAGNOSIS — R11.0 NAUSEA: ICD-10-CM

## 2024-01-12 LAB
ALBUMIN SERPL ELPH-MCNC: 3.6 G/DL — SIGNIFICANT CHANGE UP (ref 3.4–5)
ALBUMIN SERPL ELPH-MCNC: 3.6 G/DL — SIGNIFICANT CHANGE UP (ref 3.4–5)
ALP SERPL-CCNC: 58 U/L — SIGNIFICANT CHANGE UP (ref 40–120)
ALP SERPL-CCNC: 58 U/L — SIGNIFICANT CHANGE UP (ref 40–120)
ALT FLD-CCNC: 15 U/L — SIGNIFICANT CHANGE UP (ref 12–42)
ALT FLD-CCNC: 15 U/L — SIGNIFICANT CHANGE UP (ref 12–42)
ANION GAP SERPL CALC-SCNC: 12 MMOL/L — SIGNIFICANT CHANGE UP (ref 9–16)
ANION GAP SERPL CALC-SCNC: 12 MMOL/L — SIGNIFICANT CHANGE UP (ref 9–16)
APTT BLD: 37.2 SEC — HIGH (ref 24.5–35.6)
APTT BLD: 37.2 SEC — HIGH (ref 24.5–35.6)
AST SERPL-CCNC: 19 U/L — SIGNIFICANT CHANGE UP (ref 15–37)
AST SERPL-CCNC: 19 U/L — SIGNIFICANT CHANGE UP (ref 15–37)
BASOPHILS # BLD AUTO: 0.07 K/UL — SIGNIFICANT CHANGE UP (ref 0–0.2)
BASOPHILS # BLD AUTO: 0.07 K/UL — SIGNIFICANT CHANGE UP (ref 0–0.2)
BASOPHILS NFR BLD AUTO: 0.6 % — SIGNIFICANT CHANGE UP (ref 0–2)
BASOPHILS NFR BLD AUTO: 0.6 % — SIGNIFICANT CHANGE UP (ref 0–2)
BILIRUB SERPL-MCNC: 0.2 MG/DL — SIGNIFICANT CHANGE UP (ref 0.2–1.2)
BILIRUB SERPL-MCNC: 0.2 MG/DL — SIGNIFICANT CHANGE UP (ref 0.2–1.2)
BUN SERPL-MCNC: 19 MG/DL — SIGNIFICANT CHANGE UP (ref 7–23)
BUN SERPL-MCNC: 19 MG/DL — SIGNIFICANT CHANGE UP (ref 7–23)
CALCIUM SERPL-MCNC: 9.5 MG/DL — SIGNIFICANT CHANGE UP (ref 8.5–10.5)
CALCIUM SERPL-MCNC: 9.5 MG/DL — SIGNIFICANT CHANGE UP (ref 8.5–10.5)
CHLORIDE SERPL-SCNC: 101 MMOL/L — SIGNIFICANT CHANGE UP (ref 96–108)
CHLORIDE SERPL-SCNC: 101 MMOL/L — SIGNIFICANT CHANGE UP (ref 96–108)
CO2 SERPL-SCNC: 23 MMOL/L — SIGNIFICANT CHANGE UP (ref 22–31)
CO2 SERPL-SCNC: 23 MMOL/L — SIGNIFICANT CHANGE UP (ref 22–31)
CREAT SERPL-MCNC: 0.86 MG/DL — SIGNIFICANT CHANGE UP (ref 0.5–1.3)
CREAT SERPL-MCNC: 0.86 MG/DL — SIGNIFICANT CHANGE UP (ref 0.5–1.3)
D DIMER BLD IA.RAPID-MCNC: 424 NG/ML DDU — HIGH
D DIMER BLD IA.RAPID-MCNC: 424 NG/ML DDU — HIGH
EGFR: 82 ML/MIN/1.73M2 — SIGNIFICANT CHANGE UP
EGFR: 82 ML/MIN/1.73M2 — SIGNIFICANT CHANGE UP
EOSINOPHIL # BLD AUTO: 0.4 K/UL — SIGNIFICANT CHANGE UP (ref 0–0.5)
EOSINOPHIL # BLD AUTO: 0.4 K/UL — SIGNIFICANT CHANGE UP (ref 0–0.5)
EOSINOPHIL NFR BLD AUTO: 3.7 % — SIGNIFICANT CHANGE UP (ref 0–6)
EOSINOPHIL NFR BLD AUTO: 3.7 % — SIGNIFICANT CHANGE UP (ref 0–6)
GLUCOSE SERPL-MCNC: 230 MG/DL — HIGH (ref 70–99)
GLUCOSE SERPL-MCNC: 230 MG/DL — HIGH (ref 70–99)
HCG SERPL-ACNC: 2 MIU/ML — SIGNIFICANT CHANGE UP
HCG SERPL-ACNC: 2 MIU/ML — SIGNIFICANT CHANGE UP
HCT VFR BLD CALC: 38.3 % — SIGNIFICANT CHANGE UP (ref 34.5–45)
HCT VFR BLD CALC: 38.3 % — SIGNIFICANT CHANGE UP (ref 34.5–45)
HGB BLD-MCNC: 11.7 G/DL — SIGNIFICANT CHANGE UP (ref 11.5–15.5)
HGB BLD-MCNC: 11.7 G/DL — SIGNIFICANT CHANGE UP (ref 11.5–15.5)
IMM GRANULOCYTES NFR BLD AUTO: 0.3 % — SIGNIFICANT CHANGE UP (ref 0–0.9)
IMM GRANULOCYTES NFR BLD AUTO: 0.3 % — SIGNIFICANT CHANGE UP (ref 0–0.9)
INR BLD: 1.04 — SIGNIFICANT CHANGE UP (ref 0.85–1.18)
INR BLD: 1.04 — SIGNIFICANT CHANGE UP (ref 0.85–1.18)
LYMPHOCYTES # BLD AUTO: 3.81 K/UL — HIGH (ref 1–3.3)
LYMPHOCYTES # BLD AUTO: 3.81 K/UL — HIGH (ref 1–3.3)
LYMPHOCYTES # BLD AUTO: 35.2 % — SIGNIFICANT CHANGE UP (ref 13–44)
LYMPHOCYTES # BLD AUTO: 35.2 % — SIGNIFICANT CHANGE UP (ref 13–44)
MAGNESIUM SERPL-MCNC: 1.3 MG/DL — LOW (ref 1.6–2.6)
MAGNESIUM SERPL-MCNC: 1.3 MG/DL — LOW (ref 1.6–2.6)
MANUAL SMEAR VERIFICATION: SIGNIFICANT CHANGE UP
MANUAL SMEAR VERIFICATION: SIGNIFICANT CHANGE UP
MCHC RBC-ENTMCNC: 22 PG — LOW (ref 27–34)
MCHC RBC-ENTMCNC: 22 PG — LOW (ref 27–34)
MCHC RBC-ENTMCNC: 30.5 GM/DL — LOW (ref 32–36)
MCHC RBC-ENTMCNC: 30.5 GM/DL — LOW (ref 32–36)
MCV RBC AUTO: 72.1 FL — LOW (ref 80–100)
MCV RBC AUTO: 72.1 FL — LOW (ref 80–100)
MONOCYTES # BLD AUTO: 0.97 K/UL — HIGH (ref 0–0.9)
MONOCYTES # BLD AUTO: 0.97 K/UL — HIGH (ref 0–0.9)
MONOCYTES NFR BLD AUTO: 9 % — SIGNIFICANT CHANGE UP (ref 2–14)
MONOCYTES NFR BLD AUTO: 9 % — SIGNIFICANT CHANGE UP (ref 2–14)
NEUTROPHILS # BLD AUTO: 5.55 K/UL — SIGNIFICANT CHANGE UP (ref 1.8–7.4)
NEUTROPHILS # BLD AUTO: 5.55 K/UL — SIGNIFICANT CHANGE UP (ref 1.8–7.4)
NEUTROPHILS NFR BLD AUTO: 51.2 % — SIGNIFICANT CHANGE UP (ref 43–77)
NEUTROPHILS NFR BLD AUTO: 51.2 % — SIGNIFICANT CHANGE UP (ref 43–77)
NRBC # BLD: 0 /100 WBCS — SIGNIFICANT CHANGE UP (ref 0–0)
NRBC # BLD: 0 /100 WBCS — SIGNIFICANT CHANGE UP (ref 0–0)
PLAT MORPH BLD: NORMAL — SIGNIFICANT CHANGE UP
PLAT MORPH BLD: NORMAL — SIGNIFICANT CHANGE UP
PLATELET # BLD AUTO: 470 K/UL — HIGH (ref 150–400)
PLATELET # BLD AUTO: 470 K/UL — HIGH (ref 150–400)
POTASSIUM SERPL-MCNC: 3.8 MMOL/L — SIGNIFICANT CHANGE UP (ref 3.5–5.3)
POTASSIUM SERPL-MCNC: 3.8 MMOL/L — SIGNIFICANT CHANGE UP (ref 3.5–5.3)
POTASSIUM SERPL-SCNC: 3.8 MMOL/L — SIGNIFICANT CHANGE UP (ref 3.5–5.3)
POTASSIUM SERPL-SCNC: 3.8 MMOL/L — SIGNIFICANT CHANGE UP (ref 3.5–5.3)
PROT SERPL-MCNC: 8.4 G/DL — HIGH (ref 6.4–8.2)
PROT SERPL-MCNC: 8.4 G/DL — HIGH (ref 6.4–8.2)
PROTHROM AB SERPL-ACNC: 11.4 SEC — SIGNIFICANT CHANGE UP (ref 9.5–13)
PROTHROM AB SERPL-ACNC: 11.4 SEC — SIGNIFICANT CHANGE UP (ref 9.5–13)
RBC # BLD: 5.31 M/UL — HIGH (ref 3.8–5.2)
RBC # BLD: 5.31 M/UL — HIGH (ref 3.8–5.2)
RBC # FLD: 15.9 % — HIGH (ref 10.3–14.5)
RBC # FLD: 15.9 % — HIGH (ref 10.3–14.5)
RBC BLD AUTO: NORMAL — SIGNIFICANT CHANGE UP
RBC BLD AUTO: NORMAL — SIGNIFICANT CHANGE UP
SARS-COV-2 RNA SPEC QL NAA+PROBE: SIGNIFICANT CHANGE UP
SARS-COV-2 RNA SPEC QL NAA+PROBE: SIGNIFICANT CHANGE UP
SODIUM SERPL-SCNC: 136 MMOL/L — SIGNIFICANT CHANGE UP (ref 132–145)
SODIUM SERPL-SCNC: 136 MMOL/L — SIGNIFICANT CHANGE UP (ref 132–145)
TROPONIN I, HIGH SENSITIVITY RESULT: 4.5 NG/L — SIGNIFICANT CHANGE UP
TROPONIN I, HIGH SENSITIVITY RESULT: 4.5 NG/L — SIGNIFICANT CHANGE UP
TROPONIN I, HIGH SENSITIVITY RESULT: 4.8 NG/L — SIGNIFICANT CHANGE UP
TROPONIN I, HIGH SENSITIVITY RESULT: 4.8 NG/L — SIGNIFICANT CHANGE UP
TSH SERPL-MCNC: 1.7 UIU/ML — SIGNIFICANT CHANGE UP (ref 0.36–3.74)
TSH SERPL-MCNC: 1.7 UIU/ML — SIGNIFICANT CHANGE UP (ref 0.36–3.74)
WBC # BLD: 10.83 K/UL — HIGH (ref 3.8–10.5)
WBC # BLD: 10.83 K/UL — HIGH (ref 3.8–10.5)
WBC # FLD AUTO: 10.83 K/UL — HIGH (ref 3.8–10.5)
WBC # FLD AUTO: 10.83 K/UL — HIGH (ref 3.8–10.5)

## 2024-01-12 PROCEDURE — 71046 X-RAY EXAM CHEST 2 VIEWS: CPT | Mod: 26

## 2024-01-12 PROCEDURE — 71275 CT ANGIOGRAPHY CHEST: CPT | Mod: 26

## 2024-01-12 PROCEDURE — 99285 EMERGENCY DEPT VISIT HI MDM: CPT

## 2024-01-12 RX ORDER — ASPIRIN/CALCIUM CARB/MAGNESIUM 324 MG
324 TABLET ORAL ONCE
Refills: 0 | Status: COMPLETED | OUTPATIENT
Start: 2024-01-12 | End: 2024-01-12

## 2024-01-12 RX ORDER — MAGNESIUM SULFATE 500 MG/ML
2 VIAL (ML) INJECTION ONCE
Refills: 0 | Status: COMPLETED | OUTPATIENT
Start: 2024-01-12 | End: 2024-01-12

## 2024-01-12 RX ADMIN — Medication 324 MILLIGRAM(S): at 11:12

## 2024-01-12 NOTE — ED PROVIDER NOTE - CLINICAL SUMMARY MEDICAL DECISION MAKING FREE TEXT BOX
53-year-old female with past medical history of hypertension, type 2 diabetes (on oral meds only) presents complaining of chest pain x 2 days. Reports onset of chest pressure last night, constant, not exertional/pleuritic/positional, associated palpitations, nausea and trouble taking deep breath, now improved. Exam as above. Concern for cp- will assess for ACS, PE vs less likely PNA, GERD with EKG, labs, CXR, give full dose asa. Reassess and discuss case with pt's cardiologist.

## 2024-01-12 NOTE — ED PROVIDER NOTE - PATIENT PORTAL LINK FT
You can access the FollowMyHealth Patient Portal offered by [facility] by registering at the following website: [email or web address]. By joining [employer]’s FollowMyHealth portal, you will also be able to view your health information using other applications (apps) compatible with our system. You can access the FollowMyHealth Patient Portal offered by [facility] by registering at the following website: [email or web address]. By joining [employer]’s FollowMyHealth portal, you will also be able to view your health information using other applications (apps) compatible with our system.

## 2024-01-12 NOTE — ED PROVIDER NOTE - RESPIRATORY, MLM
Breath sounds clear and equal bilaterally. No respiratory distress, tachypnea, increasing wob or accessory muscle use

## 2024-01-12 NOTE — ED PROVIDER NOTE - PROGRESS NOTE DETAILS
W/u s/f wbc 10.83, dimer 424, trop 4.5, Mg 1.3, tsh 1.701  Pt ordered for Mg but reports prior adverse reaction and does not want medication  Pt also ordered for CTPE given elevated dimer, reports she has to leave at 1 PM Pt decided to stay to obtain CTPE which was unremarkable- no acute findings or PE  Repeat trop stable- 4.8  Discussed case with pt's established cardiologist- Dr. Tomas Figueroa at [facility] who reports ekg similar to prior- NSR, isolated TWI in lead III, minimal LVH. Agrees with current plan to discharge patient, no additional recommendations at this time. Will expedite follow up for patient to obtain repeat stress test.    Discussed all results obtained at time of discharge with patient. Pt agrees with plan for discharge and further evaluation with outpatient follow up. Strict return precautions given, patient verbalized understanding and all questions answered. Pt currently stable for discharge. Pt decided to stay to obtain CTPE which was unremarkable- no acute findings or PE  Repeat trop stable- 4.8  Discussed case with pt's established cardiologist- Dr. Tomas Figueroa at [facility] who reports ekg similar to prior- NSR, isolated TWI in lead III, minimal LVH. Agrees with current plan to discharge patient, no additional recommendations at this time. Will expedite follow up for patient to obtain repeat stress test.    Discussed all results obtained at time of discharge with patient. Pt agrees with plan for discharge and further evaluation with outpatient follow up. Strict return precautions given, patient verbalized understanding and all questions answered. Pt currently stable for discharge.

## 2024-01-12 NOTE — ED ADULT NURSE NOTE - NSFALLUNIVINTERV_ED_ALL_ED
Bed/Stretcher in lowest position, wheels locked, appropriate side rails in place/Call bell, personal items and telephone in reach/Instruct patient to call for assistance before getting out of bed/chair/stretcher/Non-slip footwear applied when patient is off stretcher/[place] to call system/Physically safe environment - no spills, clutter or unnecessary equipment/Purposeful proactive rounding/Room/bathroom lighting operational, light cord in reach Bed/Stretcher in lowest position, wheels locked, appropriate side rails in place/Call bell, personal items and telephone in reach/Instruct patient to call for assistance before getting out of bed/chair/stretcher/Non-slip footwear applied when patient is off stretcher/[place] to call system/Physically safe environment - no spills, clutter or unnecessary equipment/Purposeful proactive rounding/Room/bathroom lighting operational, light cord in reach

## 2024-01-12 NOTE — ED ADULT NURSE NOTE - OBJECTIVE STATEMENT
Pt walks in c/o chest tightness and palpitations since last night. No chest pain or SOB. Pt comfortable in stretcher, breathing unlabored and symmetrical.

## 2024-01-12 NOTE — ED PROVIDER NOTE - OBJECTIVE STATEMENT
53-year-old female with past medical history of hypertension, type 2 diabetes (on oral meds only) presents complaining of chest pain x 2 days.  Patient reports she was leaving work around 2 PM when she felt midsternal chest pressure, reports associated palpitations, nausea and felt like she could not take a deep breath.  Denies worsening with exertion, deep inspiration or specific positions. Denies associated back pain, not sudden in onset or tearing sensation. Feels like pain was constant until she fell asleep last night, improved at this time. Denies hx of cardiac stents or surgeries. Denies family hx of sudden cardiac death or CVD/MI prior to age 65. Denies cigarette smoking or cocaine use. Denies recent illness or sick contact. Denies fever/chills, HA, neck or back pain, dizziness, syncope, cough, sob, abd pain, n/v/d/c, urinary ssx, leg swelling, weakness/numbness/tingling. Denies hx of DVT/PE, recent travel, recent trauma or surgery, hemoptysis, leg swelling, OCP use.  Reports following with a cardiologist- had wnl TTE last year, last stress test 3-5 years ago. Suturegard Retention Suture: 2-0 Nylon

## 2024-01-12 NOTE — ED PROVIDER NOTE - CARDIAC, MLM
Normal rate, regular rhythm.  Heart sounds S1, S2.  No murmurs, rubs or gallops. No reproducible chest wall tenderness. No LE edema or calf tenderness

## 2024-01-12 NOTE — ED PROVIDER NOTE - NSFOLLOWUPINSTRUCTIONS_ED_ALL_ED_FT
Follow up with your established cardiologist- Dr. Figueroa as soon as possible. he is aware of all results and will call you for follow up appointment.   Return to ER emergently if you develop persistent or worsening chest pressure/pain, shortness of breath, nausea, severe back pain, new weakness/numbness/tingling    Chest Pain    Chest pain can be caused by many different conditions which may or may not be dangerous. Causes include heartburn, lung infections, heart attack, blood clot in lungs, skin infections, strain or damage to muscle, cartilage, or bones, etc. In addition to a history and physical examination, an electrocardiogram (ECG) or other lab tests may have been performed to determine the cause of your chest pain. Follow up with your primary care provider or with a cardiologist as instructed.     SEEK IMMEDIATE MEDICAL CARE IF YOU HAVE ANY OF THE FOLLOWING SYMPTOMS: worsening chest pain, coughing up blood, unexplained back/neck/jaw pain, severe abdominal pain, dizziness or lightheadedness, fainting, shortness of breath, sweaty or clammy skin, vomiting, or racing heart beat. These symptoms may represent a serious problem that is an emergency. Do not wait to see if the symptoms will go away. Get medical help right away. Call 911 and do not drive yourself to the hospital.

## 2024-11-04 NOTE — ED PROVIDER NOTE - CCCP TRG CHIEF CMPLNT
Call 911 for stroke/Need for follow up after discharge/Prescribed medications/Risk factors for stroke/Stroke education booklet/Stroke support groups for patients, families, and friends/Stroke warning signs and symptoms/Signs and symptoms of stroke
headache

## 2024-11-26 NOTE — ED PROVIDER NOTE - CARE PLAN
Nuclear stress test order faxed to [facility] at [phone].    Principal Discharge DX:	Headache   1

## 2024-12-30 ENCOUNTER — APPOINTMENT (OUTPATIENT)
Dept: HEART AND VASCULAR | Facility: CLINIC | Age: 54
End: 2024-12-30

## 2025-01-19 ENCOUNTER — EMERGENCY (EMERGENCY)
Facility: HOSPITAL | Age: 55
LOS: 1 days | Discharge: HOME | End: 2025-01-19
Attending: EMERGENCY MEDICINE | Admitting: EMERGENCY MEDICINE
Payer: MEDICAID

## 2025-01-19 VITALS
DIASTOLIC BLOOD PRESSURE: 90 MMHG | TEMPERATURE: 98 F | SYSTOLIC BLOOD PRESSURE: 159 MMHG | HEART RATE: 88 BPM | OXYGEN SATURATION: 99 % | RESPIRATION RATE: 18 BRPM

## 2025-01-19 VITALS
OXYGEN SATURATION: 98 % | RESPIRATION RATE: 16 BRPM | DIASTOLIC BLOOD PRESSURE: 88 MMHG | HEART RATE: 90 BPM | SYSTOLIC BLOOD PRESSURE: 148 MMHG

## 2025-01-19 PROCEDURE — 99284 EMERGENCY DEPT VISIT MOD MDM: CPT

## 2025-01-19 RX ORDER — AMOXICILLIN/POTASSIUM CLAV 875-125 MG
875 TABLET ORAL
Qty: 30 | Refills: 0
Start: 2025-01-19 | End: 2025-01-28

## 2025-01-19 RX ORDER — IBUPROFEN 200 MG
1 TABLET ORAL
Qty: 30 | Refills: 0
Start: 2025-01-19

## 2025-01-19 NOTE — ED PROVIDER NOTE - PHYSICAL EXAMINATION
VITAL SIGNS: I have reviewed nursing notes and confirm.  CONSTITUTIONAL: Well-developed; well-nourished; in no acute distress.  SKIN: Skin is warm and dry, no acute rash.  HEAD: Normocephalic; atraumatic.  EYES: PERRL, EOM intact; conjunctiva and sclera clear.  ENT: No nasal discharge; airway clear.  OP:    Notable significant dental caries with previous dental work noted, right upper posterior most tooth loose on exam, surrounding gingiva slightly erythematous, no abscess noted mild tenderness of the gingiva on exam, dental caries noted upper and lower left and right, there is areas of fillings noted also, no trismus,  no facial swelling  NECK: Supple; non tender,  no cervical adenopathy   CARD: nl rate  RESP: No resp distress, normal resp effort  ABD: Nondistrended  MSK: Normal ROM. No clubbing, cyanosis or edema.   right hand fourth and third digit in aluminum splints with blue padding, easily removed, normal flexion and extension at MCP DIP and PIP of all digits,  slight discomfort  on palpation distal right phalanx, no evidence of erythema,  of fluctuance, or signs of abscess or cellulitis, good capillary refill  NEURO: Alert, oriented. Grossly unremarkable.  PSYCH: Cooperative, appropriate.

## 2025-01-19 NOTE — ED PROVIDER NOTE - PATIENT PORTAL LINK FT
You can access the FollowMyHealth Patient Portal offered by [facility] by registering at the following website: [email or web address]. By joining [email or web address]’s FollowMyHealth portal, you will also be able to view your health information using other applications (apps) compatible with our system.

## 2025-01-19 NOTE — ED PROVIDER NOTE - NS ED ROS FT
Other than symptoms associated with present events the following is reported:  General:  No fever, no chills, no weight loss.  HEENT:  No sore throat, From upper right dental pain  Respiratory: No cough, no dyspnea, no wheeze.  Cardiovascular:  No chest pain, no palpitations, no orthopnea.  GI: No abdominal pain, no nausea/vomiting, no diarrhea.  : No dysuria, no frequency, no urgency.  Musculoskeletal:  No joint pain, no myalgia,  right hand fourth digit pain  Endocrine:  No generalized weakness, no polyuria.  Neurological:  No headache, no focal weakness.   Psychiatric: No emotional stress, no depression.  Derm:  No rash.  Heme:  No bruising, no bleeding.

## 2025-01-19 NOTE — ED PROVIDER NOTE - NPI NUMBER (FOR SYSADMIN USE ONLY) :
Elizabeth, PGY3: Patient reports slight improvement in pain.  on exam. Discussed escalation to CT but patient declined. Informed patient of limitations of TVUS for alternative pathologies. Patient expressed understanding. Will follow up with PMD/gyn as outpatient.
[[phone]],[UNKNOWN]

## 2025-01-19 NOTE — ED PROVIDER NOTE - CARE PROVIDER_API CALL
Donnie Moyer  Plastic Surgery  [address] [place] [id number]  Phone: [phone]  Fax: [phone]  Follow Up Time: 1-3 Days    Dental Clinic, [facility]  OP[place] MOnday through Saturday  Phone: (   )    -  Fax: (   )    -  Follow Up Time: 1-3 Days

## 2025-01-19 NOTE — ED PROVIDER NOTE - CLINICAL SUMMARY MEDICAL DECISION MAKING FREE TEXT BOX
CC:  dental pain  DDx:  dental caries, peridontal disease, dental abscess  Plan:  pen vk x 10 days, must f/u in 7 days with dentist or sooner if able, this is not definitive care, patient is aware of this      CC:  finger injury  DDx:  healing of known nondisplaced fracture, no evidence of infection/ cellulitis/ abscess, or re-injury  Plan:  Discussed at length with patient do not feel that reimaging is necessary as displaced fracture will show for 6 weeks on x-ray.  Patient clear that she did not reinjure it.  Given guidance to the continue to take Tylenol and Motrin.   reassurance the patient is doing everything correct and there is no evidence of infection.  Discussed that since she has been  wearing splints on her fingers for approximately 2 weeks discussed that she could have more time where the splints were off when her fingers were protected like when she was at home or sleeping.  Patient verbalized understanding of guidance.  Patient is aware that she definitively needs follow-up appointment with orthopedics or hand.  Will discharge patient home with Motrin as needed for pain.      Patient given opportunity to ask questions.  All questions answered.  Stable for discharge home at this time.

## 2025-01-19 NOTE — ED PROVIDER NOTE - PROVIDER TOKENS
PROVIDER:[TOKEN:[[id number]],FOLLOWUP:[1-3 Days]],FREE:[LAST:[Dental Clinic],FIRST:[[facility]],PHONE:[(   )    -],FAX:[(   )    -],ADDRESS:[[place] M[place]day through Saturday],FOLLOWUP:[1-3 Days]]

## 2025-01-19 NOTE — ED ADULT TRIAGE NOTE - CHIEF COMPLAINT QUOTE
toothache ("waiting for insurance to come through")and finger swelling after fall 2 weeks, nv intact
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
## 2025-01-19 NOTE — ED PROVIDER NOTE - NSFOLLOWUPINSTRUCTIONS_ED_ALL_ED_FT
Dental Cavities    WHAT YOU NEED TO KNOW:    What are dental cavities? Dental cavities, also called caries, are holes in teeth caused by bacteria. The bacteria mix with carbohydrates from foods and create acids. The acids break down areas of enamel, which covers the outside of a tooth.  Tooth Decay    What increases my risk for dental cavities?    Poor tooth care    Sugary foods and drinks    Not seeing your dentist every 6 months    Tightly spaced teeth that are hard to clean and floss    Dry mouth, caused by certain treatments or medicines    Not enough fluoride in water or not using dental products with fluoride  What are symptoms of dental cavities? You may not have any symptoms if cavities have just started to form. When cavities reach deeper parts of your tooth, you may start to feel pain. You may also have any of the following:    Pain when you chew or eat hot or cold foods    Chalky white, yellow, or brown tooth    Gum swelling  How are dental cavities diagnosed? Your dentist will look at your teeth to check for signs of enamel breakdown and cavities. He or she may also use x-rays to find cavities.    How are dental cavities treated?    A fluoride treatment may be given during dental visits, or you may use products with fluoride at home. Your dentist will tell you what kind of fluoride you need and how to use it.    A filling may be placed in your tooth after the decayed portion is removed. The filling may help to protect your tooth from more decay.    A root canal may be needed if the tooth is infected or the decay is severe.  How can I help prevent dental cavities?    Brush your teeth at least 2 times a day with fluoride toothpaste.    Use dental floss at least once a day to clean between your teeth.    See your dentist every 6 months for dental cleanings and oral exams.    Rinse your mouth with water or mouthwash after meals and snacks. Chew sugarless gum.    Eat a variety of healthy foods. Healthy foods include fruits, vegetables, whole-grain breads, low-fat dairy products, beans, lean meats, and fish. Avoid sugary and starchy food and drinks that can stick between your teeth.  Healthy Foods  When should I seek immediate care?    You have severe pain in your tooth or jaw.    You have swelling in your jaw or cheek.  When should I call my dentist?    You have a fever.    Your tooth pain gets worse.    You have questions or concerns about your condition or care.  CARE AGREEMENT:    You have the right to help plan your care. Learn about your health condition and how it may be treated. Discuss treatment options with your healthcare providers to decide what care you want to receive. You always have the right to refuse treatment.    © [employer] 1973, 2025    	  back to top            © [employer] 1973, 2025      Finger Fracture, Adult  A finger fracture is a break in any of the finger bones. Your health care provider may put a splint or cast on your finger so it will not move while it heals.    What are the causes?  The main cause of a finger fracture is an injury from:  Sports.  A fall.  Closing your finger in a drawer or door.  What increases the risk?  Playing sports.  Working with machines.  Having a condition called osteoporosis that causes weak bones.  What are the signs or symptoms?  Pain, bruises, and swelling soon after the injury.  Stiffness.  Exposed bones, in very bad cases. This is called a compound fracture or open fracture.  How is this diagnosed?  A physical exam.  Your medical history.  Your symptoms.  An X-ray.  How is this treated?  Treatment depends on how bad your fracture is. If the bones are still in place, your provider may put your finger in a splint. If many fingers are fractured, you may need a cast. A cast may be placed up to the elbow. This will keep your fingers and hand from moving.    If the bones are out of place, your provider may move them back into place or you may need to have surgery.    You may also need to do physical therapy exercises to help your finger move better and get stronger.    Follow these instructions at home:  If you have a splint that can be taken off:    Hand showing finger splint on fingers and wrist.  Wear the splint as told by your provider. Take it off only if your provider says you can.  Check the skin around it every day. Tell your provider if you see problems.  Loosen the splint if your fingers tingle, are numb, or turn cold and blue.  Keep the splint clean and dry.  If you have a cast that cannot be taken off:    Do not put pressure on any part of the cast until it's hard. This may take a few hours.  Do not stick anything inside it to scratch your skin. Doing that raises your risk of infection.  Check the skin around the cast every day. Tell your provider if you see problems.  You may put lotion on dry skin around the cast. Do not put lotion on the skin underneath the cast.  Keep the cast clean and dry.  Bathing    Do not take baths, swim, or use a hot tub until told. Ask if showers are okay.  If your splint or cast is not waterproof:  Do not let it get wet.  Cover it when you take a bath or shower. Use a cover that does not let any water in.  Managing pain, stiffness, and swelling    If told, put ice on the area.  If you have a splint that you can take off, remove it as told.  Put ice in a plastic bag.  Place a towel between your skin and the bag, or between your cast and the bag.  Leave the ice on for 20 minutes, 2–3 times a day.  If your skin turns bright red, take off the ice right away to prevent skin damage. The risk of damage is higher if you can't feel pain, heat, or cold.  Move your fingers often to reduce stiffness and swelling.  Raise the injured area above the level of your heart while you're sitting or lying down. Use a pillow to support your hand as needed.  Activity    Ask your provider if you should avoid driving or using machines while you're taking your medicine.  Do exercises as told by your provider.  Return to normal activities when you're told. Ask what things are safe for you to do.  Ask when it's safe to drive if you have a splint or cast on your finger.  General instructions    Do not smoke, vape, or use products with nicotine or tobacco in them. These can make healing take longer after surgery. If you need help quitting, talk with your provider.  Take your medicines only as told by your provider.  Keep all follow-up visits. Your provider will need to check how your finger is healing.  Contact a health care provider if:  Your pain or swelling gets worse, even with treatment.  You have trouble moving your finger.  Get help right away if:  Your finger gets numb or turns blue.  This information is not intended to replace advice given to you by your health care provider. Make sure you discuss any questions you have with your health care provider.    Document Revised: 09/19/2024 Document Reviewed: 02/16/2024  [employer] Patient Education © 2024 [employer]
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
## 2025-01-23 DIAGNOSIS — K02.9 DENTAL CARIES, UNSPECIFIED: ICD-10-CM

## 2025-01-23 DIAGNOSIS — S69.91XA UNSPECIFIED INJURY OF RIGHT WRIST, HAND AND FINGER(S), INITIAL ENCOUNTER: ICD-10-CM
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
## 2025-01-23 DIAGNOSIS — K08.89 OTHER SPECIFIED DISORDERS OF TEETH AND SUPPORTING STRUCTURES: ICD-10-CM

## 2025-01-23 DIAGNOSIS — Z87.891 PERSONAL HISTORY OF NICOTINE DEPENDENCE: ICD-10-CM

## 2025-01-30 ENCOUNTER — APPOINTMENT (OUTPATIENT)
Dept: ORTHOPEDIC SURGERY | Facility: CLINIC | Age: 55
End: 2025-01-30

## 2025-02-19 NOTE — ED ADULT NURSE NOTE - CAS DISCH ACCOMP BY
Monitor: The problem is improving with treatment.  Evaluation: Labs/tests ordered, see encounter summary.  Assessment/Treatment:   Patient is tolerating medications well, no nausea, diarrhea, constipation with tirzepatide.  We will increase the dose to tirzepatide 7.5mg at this time. No low BGL levels noted with glimepiride. Ordered microalbumin for renal function check. Recheck A1C today. Last A1C was in 2/2024 and was at 9.2%. Patient is interested in starting CGM again, previously cost was prohibitive.  
Self

## 2025-03-31 NOTE — ED PROVIDER NOTE - PMH
Population Health Chart Review & Patient Outreach Details    Outreach Performed: YES Portal Active and/or Letter inactive    Additional [facility] Health Notes:    BREAST CANCER SCREENING    Non-compliant report chart audits for BREAST CANCER SCREENING     Outreach to patient in reference to SCHEDULING A MAMMOGRAM EXAM.            Updates Requested / Reviewed:               Health Maintenance Topics Overdue:      VB Score: 4     Colon Cancer Screening  Eye Exam  Mammogram  Foot Exam    Influenza Vaccine  Shingles/Zoster Vaccine  RSV Vaccine                            
DM (diabetes mellitus)    HTN (hypertension)

## 2025-05-28 NOTE — ED ADULT TRIAGE NOTE - NS ED NURSE BANDS TYPE
Refill Request    LAST GFR: 10/02/2024; 75. PROTOCOL PASSED.     Medication request: gabapentin 300 MG Oral Cap. 1 cap PO at bedtime x1 week, then 1 cap PO BID     LOV:4/29/2025 Frederic Little DO   Due back to clinic per last office note: Per Dr. Little: \"He will follow up in 4 to 6 weeks.  NOV: 6/5/2025 Frederic Little DO      [place] 04/29/2025 #60    Urine drug screen (if applicable): n/a  Pain contract: n/a    LOV plan (if weaning or changing medications): Per Dr. Little: He has intermittent burning and numbness in the right lateral thigh; possible etiologies include trochanteric bursitis, lumbar radiculopathy or meralgia paresthetica. Gabapentin may help alleviate these symptoms.\"  
Name band;